# Patient Record
Sex: MALE | Race: WHITE | Employment: FULL TIME | URBAN - METROPOLITAN AREA
[De-identification: names, ages, dates, MRNs, and addresses within clinical notes are randomized per-mention and may not be internally consistent; named-entity substitution may affect disease eponyms.]

---

## 2017-01-30 ENCOUNTER — OFFICE VISIT (OUTPATIENT)
Dept: FAMILY MEDICINE CLINIC | Age: 47
End: 2017-01-30

## 2017-01-30 VITALS
DIASTOLIC BLOOD PRESSURE: 88 MMHG | HEART RATE: 78 BPM | TEMPERATURE: 97 F | HEIGHT: 69 IN | BODY MASS INDEX: 27.49 KG/M2 | RESPIRATION RATE: 12 BRPM | WEIGHT: 185.6 LBS | SYSTOLIC BLOOD PRESSURE: 120 MMHG

## 2017-01-30 DIAGNOSIS — Z99.89 OSA ON CPAP: ICD-10-CM

## 2017-01-30 DIAGNOSIS — G47.33 OSA ON CPAP: ICD-10-CM

## 2017-01-30 DIAGNOSIS — Z00.00 ANNUAL PHYSICAL EXAM: Primary | ICD-10-CM

## 2017-01-30 DIAGNOSIS — Z13.220 SCREENING, LIPID: ICD-10-CM

## 2017-01-30 DIAGNOSIS — Z23 NEED FOR INFLUENZA VACCINATION: ICD-10-CM

## 2017-01-30 DIAGNOSIS — G40.909 SEIZURE DISORDER (HCC): ICD-10-CM

## 2017-01-30 LAB
ALBUMIN SERPL-MCNC: 4.8 G/DL (ref 3.9–4.9)
ALP BLD-CCNC: 87 U/L (ref 35–104)
ALT SERPL-CCNC: 22 U/L (ref 0–41)
ANION GAP SERPL CALCULATED.3IONS-SCNC: 10 MEQ/L (ref 7–13)
ANISOCYTOSIS: NORMAL
AST SERPL-CCNC: 15 U/L (ref 0–40)
BASOPHILS ABSOLUTE: 0 K/UL (ref 0–0.2)
BASOPHILS RELATIVE PERCENT: 0.6 %
BILIRUB SERPL-MCNC: 0.4 MG/DL (ref 0–1.2)
BUN BLDV-MCNC: 12 MG/DL (ref 6–20)
BURR CELLS: NORMAL
CALCIUM SERPL-MCNC: 9.7 MG/DL (ref 8.6–10.2)
CHLORIDE BLD-SCNC: 102 MEQ/L (ref 98–107)
CHOLESTEROL, TOTAL: 201 MG/DL (ref 0–199)
CO2: 28 MEQ/L (ref 22–29)
CREAT SERPL-MCNC: 0.88 MG/DL (ref 0.7–1.2)
EOSINOPHILS ABSOLUTE: 0.2 K/UL (ref 0–0.7)
EOSINOPHILS RELATIVE PERCENT: 2.9 %
GFR AFRICAN AMERICAN: >60
GFR NON-AFRICAN AMERICAN: >60
GLOBULIN: 2.5 G/DL (ref 2.3–3.5)
GLUCOSE BLD-MCNC: 93 MG/DL (ref 74–109)
HCT VFR BLD CALC: 48 % (ref 42–52)
HDLC SERPL-MCNC: 42 MG/DL (ref 40–59)
HEMOGLOBIN: 16.1 G/DL (ref 14–18)
LDL CHOLESTEROL CALCULATED: 117 MG/DL (ref 0–129)
LYMPHOCYTES ABSOLUTE: 1.5 K/UL (ref 1–4.8)
LYMPHOCYTES RELATIVE PERCENT: 24.3 %
MCH RBC QN AUTO: 30 PG (ref 27–31.3)
MCHC RBC AUTO-ENTMCNC: 33.6 % (ref 33–37)
MCV RBC AUTO: 89.2 FL (ref 80–100)
MONOCYTES ABSOLUTE: 0.3 K/UL (ref 0.2–0.8)
MONOCYTES RELATIVE PERCENT: 5.6 %
NEUTROPHILS ABSOLUTE: 4.1 K/UL (ref 1.4–6.5)
NEUTROPHILS RELATIVE PERCENT: 66.6 %
PDW BLD-RTO: 13.5 % (ref 11.5–14.5)
PLATELET # BLD: 166 K/UL (ref 130–400)
POIKILOCYTES: NORMAL
POTASSIUM SERPL-SCNC: 4.7 MEQ/L (ref 3.5–5.1)
RBC # BLD: 5.39 M/UL (ref 4.7–6.1)
SLIDE REVIEW: NORMAL
SODIUM BLD-SCNC: 140 MEQ/L (ref 132–144)
TOTAL PROTEIN: 7.3 G/DL (ref 6.4–8.1)
TRIGL SERPL-MCNC: 212 MG/DL (ref 0–200)
WBC # BLD: 6.2 K/UL (ref 4.8–10.8)

## 2017-01-30 PROCEDURE — 99396 PREV VISIT EST AGE 40-64: CPT | Performed by: FAMILY MEDICINE

## 2017-01-30 PROCEDURE — 90658 IIV3 VACCINE SPLT 0.5 ML IM: CPT | Performed by: FAMILY MEDICINE

## 2017-01-30 PROCEDURE — 90471 IMMUNIZATION ADMIN: CPT | Performed by: FAMILY MEDICINE

## 2017-01-30 ASSESSMENT — PATIENT HEALTH QUESTIONNAIRE - PHQ9
2. FEELING DOWN, DEPRESSED OR HOPELESS: 0
SUM OF ALL RESPONSES TO PHQ9 QUESTIONS 1 & 2: 0
SUM OF ALL RESPONSES TO PHQ QUESTIONS 1-9: 0
1. LITTLE INTEREST OR PLEASURE IN DOING THINGS: 0

## 2017-08-03 ENCOUNTER — HOSPITAL ENCOUNTER (OUTPATIENT)
Dept: GENERAL RADIOLOGY | Age: 47
Discharge: HOME | End: 2017-08-03
Payer: COMMERCIAL

## 2017-08-03 DIAGNOSIS — S86.911A KNEE STRAIN, RIGHT, INITIAL ENCOUNTER: ICD-10-CM

## 2017-08-03 PROCEDURE — 73562 X-RAY EXAM OF KNEE 3: CPT

## 2017-08-16 ENCOUNTER — HOSPITAL ENCOUNTER (OUTPATIENT)
Dept: MRI IMAGING | Age: 47
Discharge: HOME | End: 2017-08-16
Payer: COMMERCIAL

## 2017-08-16 VITALS — WEIGHT: 185 LBS | BODY MASS INDEX: 27.52 KG/M2

## 2017-08-16 DIAGNOSIS — T14.8XXA SPRAIN: ICD-10-CM

## 2017-08-16 PROCEDURE — 73721 MRI JNT OF LWR EXTRE W/O DYE: CPT

## 2017-10-18 LAB
ALBUMIN SERPL-MCNC: 4.5 G/DL (ref 3.9–4.9)
ALP BLD-CCNC: 83 U/L (ref 35–104)
ALT SERPL-CCNC: 34 U/L (ref 0–41)
ANION GAP SERPL CALCULATED.3IONS-SCNC: 15 MEQ/L (ref 7–13)
AST SERPL-CCNC: 21 U/L (ref 0–40)
BILIRUB SERPL-MCNC: 0.2 MG/DL (ref 0–1.2)
BILIRUBIN DIRECT: 0 MG/DL (ref 0–0.3)
BILIRUBIN, INDIRECT: 0.2 MG/DL (ref 0–0.6)
BUN BLDV-MCNC: 17 MG/DL (ref 6–20)
CALCIUM SERPL-MCNC: 9.2 MG/DL (ref 8.6–10.2)
CHLORIDE BLD-SCNC: 103 MEQ/L (ref 98–107)
CO2: 24 MEQ/L (ref 22–29)
CREAT SERPL-MCNC: 0.88 MG/DL (ref 0.7–1.2)
GFR AFRICAN AMERICAN: >60
GFR NON-AFRICAN AMERICAN: >60
GLUCOSE BLD-MCNC: 84 MG/DL (ref 74–109)
POTASSIUM SERPL-SCNC: 4.1 MEQ/L (ref 3.5–5.1)
SODIUM BLD-SCNC: 142 MEQ/L (ref 132–144)
TOTAL PROTEIN: 6.6 G/DL (ref 6.4–8.1)

## 2018-03-05 ENCOUNTER — OFFICE VISIT (OUTPATIENT)
Dept: FAMILY MEDICINE CLINIC | Age: 48
End: 2018-03-05
Payer: COMMERCIAL

## 2018-03-05 VITALS
DIASTOLIC BLOOD PRESSURE: 90 MMHG | HEIGHT: 69 IN | OXYGEN SATURATION: 97 % | TEMPERATURE: 97.8 F | WEIGHT: 188 LBS | HEART RATE: 91 BPM | SYSTOLIC BLOOD PRESSURE: 124 MMHG | BODY MASS INDEX: 27.85 KG/M2

## 2018-03-05 DIAGNOSIS — Z11.4 ENCOUNTER FOR SCREENING FOR HIV: ICD-10-CM

## 2018-03-05 DIAGNOSIS — Z11.4 SCREENING FOR HIV (HUMAN IMMUNODEFICIENCY VIRUS): ICD-10-CM

## 2018-03-05 DIAGNOSIS — Z13.220 SCREENING, LIPID: ICD-10-CM

## 2018-03-05 DIAGNOSIS — Z00.00 ANNUAL PHYSICAL EXAM: Primary | ICD-10-CM

## 2018-03-05 DIAGNOSIS — Z00.00 ANNUAL PHYSICAL EXAM: ICD-10-CM

## 2018-03-05 LAB
ALBUMIN SERPL-MCNC: 4.9 G/DL (ref 3.9–4.9)
ALP BLD-CCNC: 90 U/L (ref 35–104)
ALT SERPL-CCNC: 32 U/L (ref 0–41)
ANION GAP SERPL CALCULATED.3IONS-SCNC: 15 MEQ/L (ref 7–13)
AST SERPL-CCNC: 18 U/L (ref 0–40)
BASOPHILS ABSOLUTE: 0 K/UL (ref 0–0.2)
BASOPHILS RELATIVE PERCENT: 0.8 %
BILIRUB SERPL-MCNC: 0.4 MG/DL (ref 0–1.2)
BUN BLDV-MCNC: 14 MG/DL (ref 6–20)
CALCIUM SERPL-MCNC: 9.8 MG/DL (ref 8.6–10.2)
CHLORIDE BLD-SCNC: 102 MEQ/L (ref 98–107)
CHOLESTEROL, TOTAL: 223 MG/DL (ref 0–199)
CO2: 28 MEQ/L (ref 22–29)
CREAT SERPL-MCNC: 0.9 MG/DL (ref 0.7–1.2)
EOSINOPHILS ABSOLUTE: 0 K/UL (ref 0–0.7)
EOSINOPHILS RELATIVE PERCENT: 0.9 %
GFR AFRICAN AMERICAN: >60
GFR NON-AFRICAN AMERICAN: >60
GLOBULIN: 2.5 G/DL (ref 2.3–3.5)
GLUCOSE BLD-MCNC: 93 MG/DL (ref 74–109)
HCT VFR BLD CALC: 47.8 % (ref 42–52)
HDLC SERPL-MCNC: 48 MG/DL (ref 40–59)
HEMOGLOBIN: 15.9 G/DL (ref 14–18)
LDL CHOLESTEROL CALCULATED: 144 MG/DL (ref 0–129)
LYMPHOCYTES ABSOLUTE: 1.2 K/UL (ref 1–4.8)
LYMPHOCYTES RELATIVE PERCENT: 24.8 %
MCH RBC QN AUTO: 30 PG (ref 27–31.3)
MCHC RBC AUTO-ENTMCNC: 33.3 % (ref 33–37)
MCV RBC AUTO: 90 FL (ref 80–100)
MONOCYTES ABSOLUTE: 0.4 K/UL (ref 0.2–0.8)
MONOCYTES RELATIVE PERCENT: 7.1 %
NEUTROPHILS ABSOLUTE: 3.3 K/UL (ref 1.4–6.5)
NEUTROPHILS RELATIVE PERCENT: 66.4 %
PDW BLD-RTO: 13.8 % (ref 11.5–14.5)
PLATELET # BLD: 160 K/UL (ref 130–400)
POTASSIUM SERPL-SCNC: 4.7 MEQ/L (ref 3.5–5.1)
RBC # BLD: 5.31 M/UL (ref 4.7–6.1)
SODIUM BLD-SCNC: 145 MEQ/L (ref 132–144)
TOTAL PROTEIN: 7.4 G/DL (ref 6.4–8.1)
TRIGL SERPL-MCNC: 156 MG/DL (ref 0–200)
WBC # BLD: 5 K/UL (ref 4.8–10.8)

## 2018-03-05 PROCEDURE — 99396 PREV VISIT EST AGE 40-64: CPT | Performed by: FAMILY MEDICINE

## 2018-03-05 NOTE — PROGRESS NOTES
SpO2: 97%     Weight: 188 lb (85.3 kg)     Height: 5' 9.13\" (1.756 m)       Physical Examination: General appearance - alert, well appearing, and in no distress and overweight  Mental status - alert, oriented to person, place, and time, normal mood, behavior, speech, dress, motor activity, and thought processes, affect appropriate to mood  Ears - bilateral TM's and external ear canals normal  Nose - normal and patent, no erythema, discharge or polyps  Mouth - mucous membranes moist, pharynx normal without lesions  Neck - supple, no significant adenopathy, carotids upstroke normal bilaterally, no bruits, thyroid exam: thyroid is normal in size without nodules or tenderness  Chest - clear to auscultation, no wheezes, rales or rhonchi, symmetric air entry  Heart - normal rate, regular rhythm, normal S1, S2, no murmurs, rubs, clicks or gallops  Abdomen - soft, nontender, nondistended, no masses or organomegaly  bowel sounds normal.  Rectal exam: Normal anal opening. Normal sphincter tone. KHAI revealed normal prostate without nodularity. Stool in the rectal vault was brown and guaiac negative. No masses appreciated. Neurological - alert, oriented, normal speech, no focal findings or movement disorder noted, cranial nerves II through XII intact, DTR's normal and symmetric, normal muscle tone, no tremors, strength 5/5  Musculoskeletal - no joint tenderness, deformity or swelling  Extremities - peripheral pulses normal, no pedal edema, no clubbing or cyanosis  Skin - normal coloration and turgor, no rashes, no suspicious skin lesions noted    1. Annual physical exam  CBC Auto Differential    Comprehensive Metabolic Panel   2. Screening, lipid  Lipid Panel   3. Encounter for screening for HIV     4. Screening for HIV (human immunodeficiency virus)  HIV-1,-2 w/Reflex to HIV-1 Western Blot       I have reviewed the following diagnostic data: NA.  Please see report for additional information.     Orders Placed This Encounter

## 2018-03-07 LAB — HIV-1 AND HIV-2 ANTIBODIES: NEGATIVE

## 2018-07-20 ENCOUNTER — OFFICE VISIT (OUTPATIENT)
Dept: FAMILY MEDICINE CLINIC | Age: 48
End: 2018-07-20
Payer: COMMERCIAL

## 2018-07-20 VITALS
WEIGHT: 198 LBS | OXYGEN SATURATION: 97 % | TEMPERATURE: 98 F | BODY MASS INDEX: 29.33 KG/M2 | HEIGHT: 69 IN | RESPIRATION RATE: 12 BRPM | DIASTOLIC BLOOD PRESSURE: 84 MMHG | HEART RATE: 86 BPM | SYSTOLIC BLOOD PRESSURE: 136 MMHG

## 2018-07-20 DIAGNOSIS — B07.8 OTHER VIRAL WARTS: Primary | ICD-10-CM

## 2018-07-20 PROCEDURE — 99214 OFFICE O/P EST MOD 30 MIN: CPT | Performed by: NURSE PRACTITIONER

## 2018-07-20 PROCEDURE — 17110 DESTRUCTION B9 LES UP TO 14: CPT | Performed by: NURSE PRACTITIONER

## 2018-07-20 ASSESSMENT — PATIENT HEALTH QUESTIONNAIRE - PHQ9
SUM OF ALL RESPONSES TO PHQ9 QUESTIONS 1 & 2: 0
1. LITTLE INTEREST OR PLEASURE IN DOING THINGS: 0
SUM OF ALL RESPONSES TO PHQ QUESTIONS 1-9: 0
2. FEELING DOWN, DEPRESSED OR HOPELESS: 0

## 2018-07-30 NOTE — PROGRESS NOTES
Subjective  Za Fournier, 50 y.o. male presents today with:  Chief Complaint   Patient presents with    Verruca Vulgaris     right hand       Patient presents today for removal of warts. The patient has multiple areas that are affected. The patient has 3 on his right ring finger, 3 on his right hand and wrist, 2 on his right arm. The patient also has 2 on his left hand. The patient states that he is getting then caught on things especially his fingers. The patient states that they are becoming uncomfortable. Objective    Vitals:    07/20/18 1620   BP: 136/84   Pulse: 86   Resp: 12   Temp: 98 °F (36.7 °C)   TempSrc: Temporal   SpO2: 97%   Weight: 198 lb (89.8 kg)   Height: 5' 9\" (1.753 m)       Physical Exam   Constitutional: He is oriented to person, place, and time. He appears well-developed and well-nourished. HENT:   Head: Normocephalic and atraumatic. Eyes: Conjunctivae and EOM are normal.   Neck: Normal range of motion. Pulmonary/Chest: Effort normal.   Musculoskeletal: Normal range of motion. Neurological: He is alert and oriented to person, place, and time. Skin: Skin is warm and dry. Psychiatric: He has a normal mood and affect. Cryotherapy Procedure Note    Pre-operative Diagnosis: verruca vulagaris    Post-operative Diagnosis: verruca vulgaris    Locations: right ring finger, right wrist and hand, right arm and left hand. Indications: discomfort    Anesthesia: not required     Procedure Details   History of allergy to iodine: no.  Pacemaker? no.    Patient informed of risks (permanent scarring, infection, light or dark discoloration, bleeding, infection, weakness, numbness and recurrence of the lesion) and benefits of the procedure and written informed consent obtained. The areas are treated with liquid nitrogen therapy, frozen until ice ball extended 2 mm beyond lesion, allowed to thaw, and treated again. The patient tolerated procedure well.   The patient was

## 2018-09-21 ENCOUNTER — OFFICE VISIT (OUTPATIENT)
Dept: FAMILY MEDICINE CLINIC | Age: 48
End: 2018-09-21
Payer: COMMERCIAL

## 2018-09-21 VITALS
HEIGHT: 69 IN | HEART RATE: 94 BPM | BODY MASS INDEX: 29.27 KG/M2 | WEIGHT: 197.6 LBS | TEMPERATURE: 98.2 F | RESPIRATION RATE: 14 BRPM | DIASTOLIC BLOOD PRESSURE: 80 MMHG | SYSTOLIC BLOOD PRESSURE: 120 MMHG | OXYGEN SATURATION: 98 %

## 2018-09-21 DIAGNOSIS — L03.114 CELLULITIS OF LEFT UPPER EXTREMITY: Primary | ICD-10-CM

## 2018-09-21 PROCEDURE — 99213 OFFICE O/P EST LOW 20 MIN: CPT | Performed by: NURSE PRACTITIONER

## 2018-09-21 RX ORDER — IBUPROFEN 800 MG/1
TABLET ORAL
COMMUNITY
Start: 2018-07-31

## 2018-09-21 RX ORDER — CLINDAMYCIN HYDROCHLORIDE 300 MG/1
300 CAPSULE ORAL 4 TIMES DAILY
Qty: 28 CAPSULE | Refills: 0 | Status: SHIPPED | OUTPATIENT
Start: 2018-09-21 | End: 2020-06-17 | Stop reason: SDUPTHER

## 2018-10-15 ASSESSMENT — ENCOUNTER SYMPTOMS: COLOR CHANGE: 1

## 2018-10-30 ENCOUNTER — TELEPHONE (OUTPATIENT)
Dept: FAMILY MEDICINE CLINIC | Age: 48
End: 2018-10-30

## 2018-10-30 DIAGNOSIS — G47.33 OSA ON CPAP: Primary | ICD-10-CM

## 2018-10-30 DIAGNOSIS — Z99.89 OSA ON CPAP: Primary | ICD-10-CM

## 2018-11-05 ENCOUNTER — HOSPITAL ENCOUNTER (OUTPATIENT)
Dept: LAB | Age: 48
Discharge: HOME | End: 2018-11-05
Payer: COMMERCIAL

## 2018-11-05 LAB
ALBUMIN SERPL-MCNC: 4.8 G/DL (ref 3.9–4.9)
ALP BLD-CCNC: 76 U/L (ref 35–104)
ALT SERPL-CCNC: 38 U/L (ref 0–41)
ANION GAP SERPL CALCULATED.3IONS-SCNC: 20 MEQ/L (ref 7–13)
AST SERPL-CCNC: 23 U/L (ref 0–40)
BILIRUB SERPL-MCNC: <0.2 MG/DL (ref 0–1.2)
BILIRUBIN DIRECT: <0.2 MG/DL (ref 0–0.3)
BILIRUBIN, INDIRECT: NORMAL MG/DL (ref 0–0.6)
BUN BLDV-MCNC: 14 MG/DL (ref 6–20)
CALCIUM SERPL-MCNC: 9.7 MG/DL (ref 8.6–10.2)
CHLORIDE BLD-SCNC: 105 MEQ/L (ref 98–107)
CO2: 22 MEQ/L (ref 22–29)
CREAT SERPL-MCNC: 0.97 MG/DL (ref 0.7–1.2)
GFR AFRICAN AMERICAN: >60
GFR NON-AFRICAN AMERICAN: >60
GLUCOSE BLD-MCNC: 92 MG/DL (ref 74–109)
POTASSIUM SERPL-SCNC: 4.5 MEQ/L (ref 3.5–5.1)
SODIUM BLD-SCNC: 147 MEQ/L (ref 132–144)
TOTAL PROTEIN: 7.3 G/DL (ref 6.4–8.1)

## 2018-11-05 PROCEDURE — 80076 HEPATIC FUNCTION PANEL: CPT

## 2018-11-05 PROCEDURE — 36415 COLL VENOUS BLD VENIPUNCTURE: CPT

## 2018-11-05 PROCEDURE — 80048 BASIC METABOLIC PNL TOTAL CA: CPT

## 2019-03-04 ENCOUNTER — TELEPHONE (OUTPATIENT)
Dept: ADMINISTRATIVE | Age: 49
End: 2019-03-04

## 2019-03-04 ENCOUNTER — TELEPHONE (OUTPATIENT)
Dept: FAMILY MEDICINE CLINIC | Age: 49
End: 2019-03-04

## 2019-11-01 LAB
ALBUMIN: 4.2 G/DL (ref 3.4–5)
ALP BLD-CCNC: 72 U/L (ref 33–120)
ALT SERPL-CCNC: 30 U/L (ref 10–52)
ANION GAP SERPL CALCULATED.3IONS-SCNC: 11 MMOL/L (ref 10–20)
AST SERPL-CCNC: 18 U/L (ref 9–39)
BASOPHILS # BLD: 0.03 X10E9/L (ref 0–0.1)
BASOPHILS RELATIVE PERCENT: 0.7 % (ref 0–2)
BICARBONATE: 29 MMOL/L (ref 21–32)
BILIRUB SERPL-MCNC: 0.4 MG/DL (ref 0–1.2)
BILIRUBIN DIRECT: 0.1 MG/DL (ref 0–0.3)
CALCIUM SERPL-MCNC: 9.3 MG/DL (ref 8.6–10.3)
CHLORIDE BLD-SCNC: 106 MMOL/L (ref 98–107)
CREAT SERPL-MCNC: 1.08 MG/DL (ref 0.5–1.3)
EOSINOPHIL # BLD: 0.11 X10E9/L (ref 0–0.7)
EOSINOPHILS RELATIVE PERCENT: 2.6 % (ref 0–6)
ERYTHROCYTE [DISTWIDTH] IN BLOOD BY AUTOMATED COUNT: 12.3 % (ref 11.5–14)
GFR AFRICAN AMERICAN: >60 ML/MIN/1.73M2
GFR NON-AFRICAN AMERICAN: >60 ML/MIN/1.73M2
GLUCOSE: 100 MG/DL (ref 74–99)
HCT VFR BLD CALC: 44.6 % (ref 41–52)
HEMOGLOBIN: 14.7 G/DL (ref 13.5–17)
IMMATURE GRANULOCYTES %: 0.2 % (ref 0–0.9)
LYMPHOCYTES # BLD: 28.1 % (ref 13–44)
LYMPHOCYTES RELATIVE PERCENT: 1.17 X10E9/L (ref 1.2–4.8)
MCHC RBC AUTO-ENTMCNC: 33 G/DL (ref 32–36)
MCV RBC AUTO: 90 FL (ref 80–100)
MONOCYTES # BLD: 0.31 X10E9/L (ref 0.1–1)
MONOCYTES RELATIVE PERCENT: 7.4 % (ref 2–10)
NEUTROPHILS RELATIVE PERCENT: 61 % (ref 40–80)
NEUTROPHILS: 2.54 X10E9/L (ref 1.2–7.7)
PLATELET # BLD: 152 X10E9/L (ref 150–450)
POTASSIUM SERPL-SCNC: 4.6 MMOL/L (ref 3.5–5.3)
RBC # BLD: 4.97 X10E12/L (ref 4.5–5.9)
SODIUM BLD-SCNC: 141 MMOL/L (ref 136–145)
TOTAL PROTEIN: 6.8 G/DL (ref 6.4–8.2)
UREA NITROGEN: 16 MG/DL (ref 6–23)
WBC: 4.2 X10E9/L (ref 4.4–11.3)

## 2019-11-05 ENCOUNTER — OFFICE VISIT (OUTPATIENT)
Dept: NEUROLOGY | Age: 49
End: 2019-11-05
Payer: COMMERCIAL

## 2019-11-05 VITALS
DIASTOLIC BLOOD PRESSURE: 96 MMHG | BODY MASS INDEX: 29.03 KG/M2 | SYSTOLIC BLOOD PRESSURE: 138 MMHG | HEART RATE: 94 BPM | WEIGHT: 196 LBS | HEIGHT: 69 IN

## 2019-11-05 DIAGNOSIS — G40.909 SEIZURE DISORDER (HCC): Primary | ICD-10-CM

## 2019-11-05 PROCEDURE — 99214 OFFICE O/P EST MOD 30 MIN: CPT | Performed by: PSYCHIATRY & NEUROLOGY

## 2019-11-05 RX ORDER — OXCARBAZEPINE 300 MG/1
300 TABLET, FILM COATED ORAL 3 TIMES DAILY
Qty: 270 TABLET | Refills: 3 | Status: SHIPPED | OUTPATIENT
Start: 2019-11-05 | End: 2020-09-08

## 2019-11-05 ASSESSMENT — ENCOUNTER SYMPTOMS
NAUSEA: 0
PHOTOPHOBIA: 0
SHORTNESS OF BREATH: 0
VOMITING: 0
CHOKING: 0
BACK PAIN: 0
TROUBLE SWALLOWING: 0

## 2020-06-17 ENCOUNTER — OFFICE VISIT (OUTPATIENT)
Dept: FAMILY MEDICINE CLINIC | Age: 50
End: 2020-06-17
Payer: COMMERCIAL

## 2020-06-17 VITALS
TEMPERATURE: 97 F | RESPIRATION RATE: 12 BRPM | SYSTOLIC BLOOD PRESSURE: 110 MMHG | DIASTOLIC BLOOD PRESSURE: 70 MMHG | WEIGHT: 199 LBS | HEIGHT: 69 IN | HEART RATE: 87 BPM | BODY MASS INDEX: 29.47 KG/M2 | OXYGEN SATURATION: 98 %

## 2020-06-17 PROCEDURE — 99213 OFFICE O/P EST LOW 20 MIN: CPT | Performed by: NURSE PRACTITIONER

## 2020-06-17 RX ORDER — CLINDAMYCIN HYDROCHLORIDE 300 MG/1
300 CAPSULE ORAL 4 TIMES DAILY
Qty: 28 CAPSULE | Refills: 0 | Status: SHIPPED | OUTPATIENT
Start: 2020-06-17 | End: 2020-06-24

## 2020-06-17 ASSESSMENT — ENCOUNTER SYMPTOMS
NAUSEA: 0
WHEEZING: 0
COUGH: 1
SHORTNESS OF BREATH: 1
COLOR CHANGE: 1
DIARRHEA: 0
VOMITING: 0

## 2020-06-17 ASSESSMENT — PATIENT HEALTH QUESTIONNAIRE - PHQ9
SUM OF ALL RESPONSES TO PHQ QUESTIONS 1-9: 0
2. FEELING DOWN, DEPRESSED OR HOPELESS: 0
SUM OF ALL RESPONSES TO PHQ9 QUESTIONS 1 & 2: 0
1. LITTLE INTEREST OR PLEASURE IN DOING THINGS: 0
SUM OF ALL RESPONSES TO PHQ QUESTIONS 1-9: 0

## 2020-06-17 NOTE — PROGRESS NOTES
Subjective  Karson Truong, 44 y.o. male presents today with:  Chief Complaint   Patient presents with    Elbow Problem     C/o infection in right elbow 3x days. Patient tried triple antibotic today for sx. Patient report the elbow swelled up in size        Rash   This is a new problem. Episode onset: 3 days ago. The problem has been gradually worsening since onset. The affected locations include the left elbow. The rash is characterized by redness and swelling (skin is \"tight\"). It is unknown if there was an exposure to a precipitant. Associated symptoms include coughing and shortness of breath. Pertinent negatives include no diarrhea, fatigue, fever or vomiting. Treatments tried: triple antibiotic. The treatment provided no relief. Review of Systems   Constitutional: Negative for chills, fatigue and fever. Respiratory: Positive for cough and shortness of breath. Negative for wheezing. Cardiovascular: Negative for chest pain and palpitations. Gastrointestinal: Negative for diarrhea, nausea and vomiting. Skin: Positive for color change and rash. Neurological: Negative for dizziness, light-headedness and headaches. PMH, Surgical Hx, Family Hx, and Social Hx reviewed and updated. Health Maintenance reviewed. Objective  Vitals:    06/17/20 1832   BP: 110/70   Site: Left Upper Arm   Position: Sitting   Cuff Size: Small Adult   Pulse: 87   Resp: 12   Temp: 97 °F (36.1 °C)   TempSrc: Temporal   SpO2: 98%   Weight: 199 lb (90.3 kg)   Height: 5' 9\" (1.753 m)     BP Readings from Last 3 Encounters:   06/17/20 110/70   11/05/19 (!) 138/96   09/21/18 120/80     Wt Readings from Last 3 Encounters:   06/17/20 199 lb (90.3 kg)   11/05/19 196 lb (88.9 kg)   09/21/18 197 lb 9.6 oz (89.6 kg)     Physical Exam  Constitutional:       General: He is not in acute distress. Appearance: Normal appearance. Cardiovascular:      Rate and Rhythm: Normal rate and regular rhythm.       Heart sounds: Normal

## 2020-09-08 RX ORDER — OXCARBAZEPINE 300 MG/1
TABLET, FILM COATED ORAL
Qty: 270 TABLET | Refills: 3 | Status: SHIPPED | OUTPATIENT
Start: 2020-09-08 | End: 2021-09-16

## 2020-11-20 PROBLEM — R33.9 RETENTION OF URINE: Status: ACTIVE | Noted: 2020-11-20

## 2020-11-20 PROBLEM — N39.0 UTI (URINARY TRACT INFECTION): Status: ACTIVE | Noted: 2020-11-20

## 2020-11-23 ENCOUNTER — OFFICE VISIT (OUTPATIENT)
Dept: NEUROLOGY | Age: 50
End: 2020-11-23
Payer: COMMERCIAL

## 2020-11-23 VITALS
DIASTOLIC BLOOD PRESSURE: 94 MMHG | WEIGHT: 198.8 LBS | BODY MASS INDEX: 29.36 KG/M2 | HEART RATE: 69 BPM | SYSTOLIC BLOOD PRESSURE: 142 MMHG

## 2020-11-23 DIAGNOSIS — G40.909 SEIZURE DISORDER (HCC): ICD-10-CM

## 2020-11-23 LAB
ALBUMIN SERPL-MCNC: 4.4 G/DL (ref 3.5–4.6)
ALP BLD-CCNC: 93 U/L (ref 35–104)
ALT SERPL-CCNC: 25 U/L (ref 0–41)
ANION GAP SERPL CALCULATED.3IONS-SCNC: 11 MEQ/L (ref 9–15)
AST SERPL-CCNC: 16 U/L (ref 0–40)
BASOPHILS ABSOLUTE: 0 K/UL (ref 0–0.2)
BASOPHILS RELATIVE PERCENT: 0.5 %
BILIRUB SERPL-MCNC: 0.3 MG/DL (ref 0.2–0.7)
BILIRUBIN DIRECT: <0.2 MG/DL (ref 0–0.4)
BILIRUBIN, INDIRECT: NORMAL MG/DL (ref 0–0.6)
BUN BLDV-MCNC: 11 MG/DL (ref 6–20)
CALCIUM SERPL-MCNC: 9.7 MG/DL (ref 8.5–9.9)
CHLORIDE BLD-SCNC: 103 MEQ/L (ref 95–107)
CO2: 27 MEQ/L (ref 20–31)
CREAT SERPL-MCNC: 0.91 MG/DL (ref 0.7–1.2)
EOSINOPHILS ABSOLUTE: 0.1 K/UL (ref 0–0.7)
EOSINOPHILS RELATIVE PERCENT: 2.9 %
GFR AFRICAN AMERICAN: >60
GFR NON-AFRICAN AMERICAN: >60
GLUCOSE BLD-MCNC: 101 MG/DL (ref 70–99)
HCT VFR BLD CALC: 44.6 % (ref 42–52)
HEMOGLOBIN: 14.9 G/DL (ref 14–18)
LYMPHOCYTES ABSOLUTE: 1.7 K/UL (ref 1–4.8)
LYMPHOCYTES RELATIVE PERCENT: 37.5 %
MCH RBC QN AUTO: 29.9 PG (ref 27–31.3)
MCHC RBC AUTO-ENTMCNC: 33.5 % (ref 33–37)
MCV RBC AUTO: 89.3 FL (ref 80–100)
MONOCYTES ABSOLUTE: 0.4 K/UL (ref 0.2–0.8)
MONOCYTES RELATIVE PERCENT: 8 %
NEUTROPHILS ABSOLUTE: 2.3 K/UL (ref 1.4–6.5)
NEUTROPHILS RELATIVE PERCENT: 51.1 %
PDW BLD-RTO: 13.7 % (ref 11.5–14.5)
PLATELET # BLD: 152 K/UL (ref 130–400)
POTASSIUM SERPL-SCNC: 4.3 MEQ/L (ref 3.4–4.9)
RBC # BLD: 4.99 M/UL (ref 4.7–6.1)
SODIUM BLD-SCNC: 141 MEQ/L (ref 135–144)
TOTAL PROTEIN: 6.7 G/DL (ref 6.3–8)
WBC # BLD: 4.4 K/UL (ref 4.8–10.8)

## 2020-11-23 PROCEDURE — 99213 OFFICE O/P EST LOW 20 MIN: CPT | Performed by: PSYCHIATRY & NEUROLOGY

## 2020-11-23 ASSESSMENT — ENCOUNTER SYMPTOMS
BACK PAIN: 0
COLOR CHANGE: 0
NAUSEA: 0
CHOKING: 0
VOMITING: 0
PHOTOPHOBIA: 0
SHORTNESS OF BREATH: 0
TROUBLE SWALLOWING: 0

## 2020-11-23 NOTE — PROGRESS NOTES
Subjective:      Patient ID: Lalito Hardin is a 44 y.o. male who presents today for:  Chief Complaint   Patient presents with    Follow-up     Pt is doing good and no recent seizures. HPI 47-year-old right-handed gentleman with a known history of seizure disorder. Patient continues on Trileptal 300 mg twice a day. Patient has not been seen for a year patient is doing quite well on the medication he has not any side effects of medication. His last liver tests reveals normal sodium levels this was done a year ago. Patient has not any changes during the year.       Past Medical History:   Diagnosis Date    Seizure disorder [facility])      Past Surgical History:   Procedure Laterality Date    URETHRAL STRICTURE DILATATION       Social History     Socioeconomic History    Marital status:      Spouse name: Not on file    Number of children: Not on file    Years of education: Not on file    Highest education level: Not on file   Occupational History    Not on file   Social Needs    Financial resource strain: Not on file    Food insecurity     Worry: Not on file     Inability: Not on file    Transportation needs     Medical: Not on file     Non-medical: Not on file   Tobacco Use    Smoking status: Never Smoker    Smokeless tobacco: Current User     Types: Snuff   Substance and Sexual Activity    Alcohol use: Yes     Comment: occasionally     Drug use: No    Sexual activity: Not on file   Lifestyle    Physical activity     Days per week: Not on file     Minutes per session: Not on file    Stress: Not on file   Relationships    Social connections     Talks on phone: Not on file     Gets together: Not on file     Attends Mormon service: Not on file     Active member of club or organization: Not on file     Attends meetings of clubs or organizations: Not on file     Relationship status: Not on file    Intimate partner violence     Fear of current or ex partner: Not on file     Emotionally abused: Not on file     Physically abused: Not on file     Forced sexual activity: Not on file   Other Topics Concern    Not on file   Social History Narrative    Not on file     Family History   Problem Relation Age of Onset    High Blood Pressure Mother     High Blood Pressure Father      Allergies   Allergen Reactions    Pcn [Penicillins] Anaphylaxis    Bactrim [Sulfamethoxazole-Trimethoprim]        Current Outpatient Medications   Medication Sig Dispense Refill    OXcarbazepine (TRILEPTAL) 300 MG tablet TAKE 1 TABLET BY MOUTH THREE TIMES A  tablet 3    Respiratory Therapy Supplies MISC 1 each by Does not apply route nightly 1 each 0    CPAP Machine MISC by Does not apply route Auto-pap, 5-18 mm H20. 1 each 0    Respiratory Therapy Supplies (PILLOW MASK/ADULT) MISC 1 Device by Does not apply route nightly 1 each 0    ibuprofen (ADVIL;MOTRIN) 800 MG tablet       aspirin 81 MG EC tablet Take 81 mg by mouth daily. No current facility-administered medications for this visit. Review of Systems   Constitutional: Negative for fever. HENT: Negative for ear pain, tinnitus and trouble swallowing. Eyes: Negative for photophobia and visual disturbance. Respiratory: Negative for choking and shortness of breath. Cardiovascular: Negative for chest pain and palpitations. Gastrointestinal: Negative for nausea and vomiting. Musculoskeletal: Negative for back pain, gait problem, joint swelling, myalgias, neck pain and neck stiffness. Skin: Negative for color change. Allergic/Immunologic: Negative for food allergies. Neurological: Negative for dizziness, tremors, seizures, syncope, facial asymmetry, speech difficulty, weakness, light-headedness, numbness and headaches. Psychiatric/Behavioral: Negative for behavioral problems, confusion, hallucinations and sleep disturbance.        Objective:   BP (!) 142/94 (Site: Left Upper Arm, Position: Sitting, Cuff Size: Medium Adult)   Pulse 69 [address] 144 03/05/2018     No results found for: Gregary Angry, LABBENZ, CANNAB, COCAINESCRN, LABMETH, OPIATESCREENURINE, PHENCYCLIDINESCREENURINE, PPXUR, ETOH  No results found for: LITHIUM, DILFRTOT, VALPROATE    Assessment:       Diagnosis Orders   1. Seizure disorder [facility])     44year-old right-hand gentleman history of generalized seizure and sleep apnea. The patient actually doing quite well. It is more than likely that these are frontal lobe seizures from probable sleep apnea syndrome. This is well been described recently. Patient does not have narcolepsy and has not any insomnia  Patient last seizure was 9 years ago patient continues on oxcarbazepine 300 mg.  3 times a day. Has been compliant and doing quite well  We will arrange for his laboratory tests. Plan:      No orders of the defined types were placed in this encounter. No orders of the defined types were placed in this encounter. No follow-ups on file.       Tanya Gunter MD

## 2020-12-20 PROBLEM — N39.0 UTI (URINARY TRACT INFECTION): Status: RESOLVED | Noted: 2020-11-20 | Resolved: 2020-12-20

## 2021-09-16 RX ORDER — OXCARBAZEPINE 300 MG/1
TABLET, FILM COATED ORAL
Qty: 270 TABLET | Refills: 3 | Status: SHIPPED | OUTPATIENT
Start: 2021-09-16

## 2021-11-19 PROBLEM — J34.2 DEVIATED NASAL SEPTUM: Status: ACTIVE | Noted: 2021-11-19

## 2021-11-19 PROBLEM — N45.3 ORCHITIS AND EPIDIDYMITIS: Status: ACTIVE | Noted: 2021-11-19

## 2021-11-19 PROBLEM — R03.0 PREHYPERTENSION: Status: ACTIVE | Noted: 2021-11-19

## 2021-11-22 ENCOUNTER — OFFICE VISIT (OUTPATIENT)
Dept: NEUROLOGY | Age: 51
End: 2021-11-22
Payer: COMMERCIAL

## 2021-11-22 VITALS
HEART RATE: 85 BPM | WEIGHT: 202 LBS | DIASTOLIC BLOOD PRESSURE: 84 MMHG | SYSTOLIC BLOOD PRESSURE: 136 MMHG | BODY MASS INDEX: 29.83 KG/M2

## 2021-11-22 DIAGNOSIS — G47.33 OBSTRUCTIVE SLEEP APNEA SYNDROME: ICD-10-CM

## 2021-11-22 DIAGNOSIS — G40.909 SEIZURE DISORDER (HCC): ICD-10-CM

## 2021-11-22 DIAGNOSIS — G40.909 SEIZURE DISORDER (HCC): Primary | ICD-10-CM

## 2021-11-22 LAB
ALBUMIN SERPL-MCNC: 4.5 G/DL (ref 3.5–4.6)
ALP BLD-CCNC: 86 U/L (ref 35–104)
ALT SERPL-CCNC: 31 U/L (ref 0–41)
ANION GAP SERPL CALCULATED.3IONS-SCNC: 8 MEQ/L (ref 9–15)
AST SERPL-CCNC: 17 U/L (ref 0–40)
BILIRUB SERPL-MCNC: 0.3 MG/DL (ref 0.2–0.7)
BILIRUBIN DIRECT: <0.2 MG/DL (ref 0–0.4)
BILIRUBIN, INDIRECT: NORMAL MG/DL (ref 0–0.6)
BUN BLDV-MCNC: 10 MG/DL (ref 6–20)
CALCIUM SERPL-MCNC: 9.4 MG/DL (ref 8.5–9.9)
CHLORIDE BLD-SCNC: 102 MEQ/L (ref 95–107)
CO2: 28 MEQ/L (ref 20–31)
CREAT SERPL-MCNC: 0.89 MG/DL (ref 0.7–1.2)
GFR AFRICAN AMERICAN: >60
GFR NON-AFRICAN AMERICAN: >60
GLUCOSE BLD-MCNC: 80 MG/DL (ref 70–99)
POTASSIUM SERPL-SCNC: 4.6 MEQ/L (ref 3.4–4.9)
SODIUM BLD-SCNC: 138 MEQ/L (ref 135–144)
TOTAL PROTEIN: 7.2 G/DL (ref 6.3–8)

## 2021-11-22 PROCEDURE — 99213 OFFICE O/P EST LOW 20 MIN: CPT | Performed by: PSYCHIATRY & NEUROLOGY

## 2021-11-22 ASSESSMENT — ENCOUNTER SYMPTOMS
NAUSEA: 0
TROUBLE SWALLOWING: 0
BACK PAIN: 0
VOMITING: 0
SHORTNESS OF BREATH: 0
CHOKING: 0
COLOR CHANGE: 0
PHOTOPHOBIA: 0

## 2021-11-22 NOTE — PROGRESS NOTES
Subjective:      Patient ID: Jess Montanez is a 46 y.o. male who presents today for:  Chief Complaint   Patient presents with    Follow-up     Pt states that things have been going good. He says no seizures that he is aware of . He state sno concerns at this time. HPI 52-year-old right-handed gentleman now with a history of generalized seizure disorder. Patient is on Trileptal 300 mg three times a day. Patient has not been seen here for a year but does very well on the medication. She reports no hospitalizations or any other symptoms    Past Medical History:   Diagnosis Date    Seizure disorder [facility])      Past Surgical History:   Procedure Laterality Date    URETHRAL STRICTURE DILATATION       Social History     Socioeconomic History    Marital status:      Spouse name: Not on file    Number of children: Not on file    Years of education: Not on file    Highest education level: Not on file   Occupational History    Not on file   Tobacco Use    Smoking status: Never Smoker    Smokeless tobacco: Current User     Types: Snuff   Substance and Sexual Activity    Alcohol use: Yes     Comment: occasionally     Drug use: No    Sexual activity: Not on file   Other Topics Concern    Not on file   Social History Narrative    Not on file     Social Determinants of Health     Financial Resource Strain:     Difficulty of Paying Living Expenses: Not on file   Food Insecurity:     Worried About Running Out of Food in the Last Year: Not on file    Socrates of Food in the Last Year: Not on file   Transportation Needs:     Lack of Transportation (Medical): Not on file    Lack of Transportation (Non-Medical):  Not on file   Physical Activity:     Days of Exercise per Week: Not on file    Minutes of Exercise per Session: Not on file   Stress:     Feeling of Stress : Not on file   Social Connections:     Frequency of Communication with Friends and Family: Not on file    Frequency of Social Gatherings with Friends and Family: Not on file    Attends Anglican Services: Not on file    Active Member of Clubs or Organizations: Not on file    Attends Club or Organization Meetings: Not on file    Marital Status: Not on file   Intimate Partner Violence:     Fear of Current or Ex-Partner: Not on file    Emotionally Abused: Not on file    Physically Abused: Not on file    Sexually Abused: Not on file   Housing Stability:     Unable to Pay for Housing in the Last Year: Not on file    Number of [place] in the Last Year: Not on file    Unstable Housing in the Last Year: Not on file     Family History   Problem Relation Age of Onset    High Blood Pressure Mother     High Blood Pressure Father      Allergies   Allergen Reactions    Penicillins Anaphylaxis and Swelling    Sulfamethoxazole-Trimethoprim Other (See Comments)       Current Outpatient Medications   Medication Sig Dispense Refill    OXcarbazepine (TRILEPTAL) 300 MG tablet TAKE 1 TABLET BY MOUTH THREE TIMES A  tablet 3    Respiratory Therapy Supplies MISC 1 each by Does not apply route nightly 1 each 0    ibuprofen (ADVIL;MOTRIN) 800 MG tablet       CPAP Machine MISC by Does not apply route Auto-pap, 5-18 mm H20. 1 each 0    Respiratory Therapy Supplies (PILLOW MASK/ADULT) MISC 1 Device by Does not apply route nightly 1 each 0    aspirin 81 MG EC tablet Take 81 mg by mouth daily. No current facility-administered medications for this visit. Review of Systems   Constitutional: Negative for fever. HENT: Negative for ear pain, tinnitus and trouble swallowing. Eyes: Negative for photophobia and visual disturbance. Respiratory: Negative for choking and shortness of breath. Cardiovascular: Negative for chest pain and palpitations. Gastrointestinal: Negative for nausea and vomiting. Musculoskeletal: Negative for back pain, gait problem, joint swelling, myalgias, neck pain and neck stiffness.    Skin: Negative for color change. Allergic/Immunologic: Negative for food allergies. Neurological: Negative for dizziness, tremors, seizures, syncope, facial asymmetry, speech difficulty, weakness, light-headedness, numbness and headaches. Psychiatric/Behavioral: Negative for behavioral problems, confusion, hallucinations and sleep disturbance. Objective:   /84 (Site: Left Upper Arm, Position: Sitting, Cuff Size: Medium Adult)   Pulse 85   Wt 202 lb (91.6 kg)   BMI 29.83 kg/m²     Physical Exam  Vitals reviewed. Eyes:      Pupils: Pupils are equal, round, and reactive to light. Cardiovascular:      Rate and Rhythm: Normal rate and regular rhythm. Heart sounds: No murmur heard. Pulmonary:      Effort: Pulmonary effort is normal.      Breath sounds: Normal breath sounds. Abdominal:      General: Bowel sounds are normal.   Musculoskeletal:         General: Normal range of motion. Cervical back: Normal range of motion. Skin:     General: Skin is warm. Neurological:      Mental Status: He is alert and oriented to person, place, and time. Cranial Nerves: No cranial nerve deficit. Sensory: No sensory deficit. Motor: No abnormal muscle tone. Coordination: Coordination normal.      Deep Tendon Reflexes: Reflexes are normal and symmetric. Babinski sign absent on the right side. Babinski sign absent on the left side. Psychiatric:         Mood and Affect: Mood normal.         No results found.     Lab Results   Component Value Date    WBC 4.4 11/23/2020    RBC 4.99 11/23/2020    RBC 4.87 10/18/2011    HGB 14.9 11/23/2020    HCT 44.6 11/23/2020    MCV 89.3 11/23/2020    MCH 29.9 11/23/2020    MCHC 33.5 11/23/2020    RDW 13.7 11/23/2020     11/23/2020    MPV 8.8 02/22/2015     Lab Results   Component Value Date     11/23/2020    K 4.3 11/23/2020     11/23/2020    CO2 27 11/23/2020    BUN 11 11/23/2020    CREATININE 0.91 11/23/2020    GFRAA >60.0 11/23/2020 LABGLOM >60.0 11/23/2020    GLUCOSE 101 11/23/2020    GLUCOSE 100 11/01/2019    PROT 6.7 11/23/2020    LABALBU 4.4 11/23/2020    LABALBU 4.2 11/01/2019    CALCIUM 9.7 11/23/2020    BILITOT 0.3 11/23/2020    ALKPHOS 93 11/23/2020    AST 16 11/23/2020    ALT 25 11/23/2020     No results found for: PROTIME, INR  Lab Results   Component Value Date    TSH 2.299 06/04/2013    TSH Not Done 06/04/2013     Lab Results   Component Value Date    TRIG 156 03/05/2018    HDL 48 03/05/2018    LDLCALC 144 03/05/2018     No results found for: Weyman May, LABBENZ, CANNAB, COCAINESCRN, LABMETH, OPIATESCREENURINE, PHENCYCLIDINESCREENURINE, PPXUR, ETOH  No results found for: LITHIUM, DILFRTOT, VALPROATE    Assessment:       Diagnosis Orders   1. Seizure disorder [facility])  Basic Metabolic Panel    Hepatic Function Panel    Basic Metabolic Panel   2. Obstructive sleep apnea syndrome     Seizure disorder well controlled on oxcarbazepine 300 mg 3 times a day and denies seizures for many years. Some of the seizures may be  related to underlying sleep apnea and he uses machine on a regular basis. We keep an eye on this. He will require blood tests to make sure he does not have hyponatremia. Patient be followed in a year      Plan:      Orders Placed This Encounter   Procedures    Basic Metabolic Panel     Standing Status:   Future     Standing Expiration Date:   11/22/2022    Hepatic Function Panel     Standing Status:   Future     Standing Expiration Date:   11/22/2022    Basic Metabolic Panel     Standing Status:   Future     Standing Expiration Date:   11/22/2022     No orders of the defined types were placed in this encounter. No follow-ups on file.       Woody Monaco MD

## 2022-11-07 RX ORDER — OXCARBAZEPINE 300 MG/1
TABLET, FILM COATED ORAL
Qty: 270 TABLET | Refills: 3 | Status: SHIPPED | OUTPATIENT
Start: 2022-11-07

## 2022-11-17 PROBLEM — H16.142 PUNCTATE KERATITIS OF LEFT EYE: Status: ACTIVE | Noted: 2021-01-18

## 2022-11-21 ENCOUNTER — OFFICE VISIT (OUTPATIENT)
Dept: NEUROLOGY | Age: 52
End: 2022-11-21
Payer: COMMERCIAL

## 2022-11-21 VITALS
WEIGHT: 200.4 LBS | SYSTOLIC BLOOD PRESSURE: 130 MMHG | DIASTOLIC BLOOD PRESSURE: 82 MMHG | HEART RATE: 75 BPM | BODY MASS INDEX: 29.59 KG/M2

## 2022-11-21 DIAGNOSIS — G47.33 OBSTRUCTIVE SLEEP APNEA SYNDROME: ICD-10-CM

## 2022-11-21 DIAGNOSIS — G40.909 SEIZURE DISORDER (HCC): Primary | ICD-10-CM

## 2022-11-21 DIAGNOSIS — G40.909 SEIZURE DISORDER (HCC): ICD-10-CM

## 2022-11-21 LAB
ALBUMIN SERPL-MCNC: 4.6 G/DL (ref 3.5–4.6)
ALP BLD-CCNC: 97 U/L (ref 35–104)
ALT SERPL-CCNC: 27 U/L (ref 0–41)
ANION GAP SERPL CALCULATED.3IONS-SCNC: 17 MEQ/L (ref 9–15)
AST SERPL-CCNC: 17 U/L (ref 0–40)
BILIRUB SERPL-MCNC: <0.2 MG/DL (ref 0.2–0.7)
BUN BLDV-MCNC: 13 MG/DL (ref 6–20)
CALCIUM SERPL-MCNC: 9.7 MG/DL (ref 8.5–9.9)
CHLORIDE BLD-SCNC: 109 MEQ/L (ref 95–107)
CO2: 24 MEQ/L (ref 20–31)
CREAT SERPL-MCNC: 1.19 MG/DL (ref 0.7–1.2)
GFR SERPL CREATININE-BSD FRML MDRD: >60 ML/MIN/{1.73_M2}
GLOBULIN: 2.5 G/DL (ref 2.3–3.5)
GLUCOSE BLD-MCNC: 76 MG/DL (ref 70–99)
POTASSIUM SERPL-SCNC: 4.1 MEQ/L (ref 3.4–4.9)
SODIUM BLD-SCNC: 150 MEQ/L (ref 135–144)
TOTAL PROTEIN: 7.1 G/DL (ref 6.3–8)

## 2022-11-21 PROCEDURE — 99213 OFFICE O/P EST LOW 20 MIN: CPT | Performed by: PSYCHIATRY & NEUROLOGY

## 2022-11-21 RX ORDER — NAPROXEN 500 MG/1
TABLET ORAL
COMMUNITY
Start: 2022-07-24

## 2022-11-21 ASSESSMENT — ENCOUNTER SYMPTOMS
COLOR CHANGE: 0
TROUBLE SWALLOWING: 0
PHOTOPHOBIA: 0
BACK PAIN: 0
CHOKING: 0
SHORTNESS OF BREATH: 0
VOMITING: 0
NAUSEA: 0

## 2022-11-21 NOTE — PROGRESS NOTES
Subjective:      Patient ID: Clara Harley is a 45 y.o. male who presents today for:  Chief Complaint   Patient presents with    Follow-up     Pt states he is well. Pt stated his wife told him he has not had a seizure and is sleeping better. HPI 45 right-handed gentleman now with a history of generalized seizure disorder. Patient is on Trileptal 300 mg 3 times a day. Patient has been compliant and has not any hyponatremia and his last sodiums were done in November last year and has not any further labs. Patient does not report of any dizzy spells.   He has sleep apnea and is sleeping well    Past Medical History:   Diagnosis Date    Seizure disorder [facility])      Past Surgical History:   Procedure Laterality Date    URETHRAL STRICTURE DILATATION       Social History     Socioeconomic History    Marital status:      Spouse name: Not on file    Number of children: Not on file    Years of education: Not on file    Highest education level: Not on file   Occupational History    Not on file   Tobacco Use    Smoking status: Never    Smokeless tobacco: Current     Types: Snuff   Substance and Sexual Activity    Alcohol use: Yes     Comment: occasionally     Drug use: No    Sexual activity: Not on file   Other Topics Concern    Not on file   Social History Narrative    Not on file     Social Determinants of Health     Financial Resource Strain: Not on file   Food Insecurity: Not on file   Transportation Needs: Not on file   Physical Activity: Not on file   Stress: Not on file   Social Connections: Not on file   Intimate Partner Violence: Not on file   Housing Stability: Not on file     Family History   Problem Relation Age of Onset    High Blood Pressure Mother     High Blood Pressure Father      Allergies   Allergen Reactions    Penicillins Anaphylaxis and Swelling    Sulfamethoxazole-Trimethoprim Other (See Comments)       Current Outpatient Medications   Medication Sig Dispense Refill    naproxen (NAPROSYN) 500 MG tablet Take by mouth      OXcarbazepine (TRILEPTAL) 300 MG tablet TAKE 1 TABLET BY MOUTH THREE TIMES A  tablet 3    Respiratory Therapy Supplies MISC 1 each by Does not apply route nightly 1 each 0    ibuprofen (ADVIL;MOTRIN) 800 MG tablet       CPAP Machine MISC by Does not apply route Auto-pap, 5-18 mm H20. 1 each 0    Respiratory Therapy Supplies (PILLOW MASK/ADULT) MISC 1 Device by Does not apply route nightly 1 each 0    aspirin 81 MG EC tablet Take 81 mg by mouth daily. (Patient not taking: Reported on 11/21/2022)       No current facility-administered medications for this visit. Review of Systems   Constitutional:  Negative for fever. HENT:  Negative for ear pain, tinnitus and trouble swallowing. Eyes:  Negative for photophobia and visual disturbance. Respiratory:  Negative for choking and shortness of breath. Cardiovascular:  Negative for chest pain and palpitations. Gastrointestinal:  Negative for nausea and vomiting. Musculoskeletal:  Negative for back pain, gait problem, joint swelling, myalgias, neck pain and neck stiffness. Skin:  Negative for color change. Allergic/Immunologic: Negative for food allergies. Neurological:  Negative for dizziness, tremors, seizures, syncope, facial asymmetry, speech difficulty, weakness, light-headedness, numbness and headaches. Psychiatric/Behavioral:  Negative for behavioral problems, confusion, hallucinations and sleep disturbance. Objective:   /82 (Site: Right Upper Arm, Position: Sitting, Cuff Size: Medium Adult)   Pulse 75   Wt 200 lb 6.4 oz (90.9 kg)   BMI 29.59 kg/m²     Physical Exam  Vitals reviewed. Eyes:      Pupils: Pupils are equal, round, and reactive to light. Cardiovascular:      Rate and Rhythm: Normal rate and regular rhythm. Heart sounds: No murmur heard. Pulmonary:      Effort: Pulmonary effort is normal.      Breath sounds: Normal breath sounds.    Abdominal:      General: Bowel sounds are normal.   Musculoskeletal:         General: Normal range of motion. Cervical back: Normal range of motion. Skin:     General: Skin is warm. Neurological:      Mental Status: He is alert and oriented to person, place, and time. Cranial Nerves: No cranial nerve deficit. Sensory: No sensory deficit. Motor: No abnormal muscle tone. Coordination: Coordination normal.      Deep Tendon Reflexes: Reflexes are normal and symmetric. Babinski sign absent on the right side. Babinski sign absent on the left side. Psychiatric:         Mood and Affect: Mood normal.       No results found.     Lab Results   Component Value Date/Time    WBC 4.4 11/23/2020 08:59 AM    RBC 4.99 11/23/2020 08:59 AM    RBC 4.87 10/18/2011 01:08 PM    HGB 14.9 11/23/2020 08:59 AM    HCT 44.6 11/23/2020 08:59 AM    MCV 89.3 11/23/2020 08:59 AM    MCH 29.9 11/23/2020 08:59 AM    MCHC 33.5 11/23/2020 08:59 AM    RDW 13.7 11/23/2020 08:59 AM     11/23/2020 08:59 AM    MPV 8.8 02/22/2015 10:29 AM     Lab Results   Component Value Date/Time     11/22/2021 01:28 PM    K 4.6 11/22/2021 01:28 PM     11/22/2021 01:28 PM    CO2 28 11/22/2021 01:28 PM    BUN 10 11/22/2021 01:28 PM    CREATININE 0.89 11/22/2021 01:28 PM    GFRAA >60.0 11/22/2021 01:28 PM    LABGLOM >60.0 11/22/2021 01:28 PM    GLUCOSE 80 11/22/2021 01:28 PM    GLUCOSE 100 11/01/2019 10:19 AM    PROT 7.2 11/22/2021 01:28 PM    LABALBU 4.5 11/22/2021 01:28 PM    LABALBU 4.2 11/01/2019 10:19 AM    CALCIUM 9.4 11/22/2021 01:28 PM    BILITOT 0.3 11/22/2021 01:28 PM    ALKPHOS 86 11/22/2021 01:28 PM    AST 17 11/22/2021 01:28 PM    ALT 31 11/22/2021 01:28 PM     No results found for: PROTIME, INR  Lab Results   Component Value Date/Time    TSH 2.299 06/04/2013 08:44 PM    TSH Not Done 06/04/2013 08:44 PM     Lab Results   Component Value Date/Time    TRIG 156 03/05/2018 11:12 AM    HDL 48 03/05/2018 11:12 AM    LDLCALC 144 03/05/2018 11:12 AM     No results found for: Gerhardt Rings, LABBENZ, CANNAB, COCAINESCRN, LABMETH, OPIATESCREENURINE, PHENCYCLIDINESCREENURINE, PPXUR, ETOH  No results found for: LITHIUM, DILFRTOT, VALPROATE    Assessment:       Diagnosis Orders   1. Seizure disorder ([facility] [address]        2. Obstructive sleep apnea syndrome        Seizure disorder without recurrent seizures. Patient has been on Trileptal 300 mg 3 times a day with very good control. He has not any hyponatremia has not any recent labs done. He has sleep apnea and uses a CPAP machine and sleeps well. Is not any dizzy spells or any other side effects of concern. We will keep him on the same medication for now and he will let me know if there are any concerns. Lam Shaikh MD, Nora Santana, American Board of Psychiatry & Neurology  Board Certified in Vascular Neurology  Board Certified in Neuromuscular Medicine  Certified in [facility]:      No orders of the defined types were placed in this encounter. No orders of the defined types were placed in this encounter. No follow-ups on file.       Nova Dugan MD

## 2023-03-20 ENCOUNTER — CLINICAL SUPPORT (OUTPATIENT)
Dept: PRIMARY CARE | Facility: CLINIC | Age: 53
End: 2023-03-20
Payer: COMMERCIAL

## 2023-03-20 DIAGNOSIS — Z23 NEED FOR SHINGLES VACCINE: ICD-10-CM

## 2023-03-20 PROCEDURE — 90471 IMMUNIZATION ADMIN: CPT | Performed by: FAMILY MEDICINE

## 2023-03-20 PROCEDURE — 90750 HZV VACC RECOMBINANT IM: CPT | Performed by: FAMILY MEDICINE

## 2023-08-30 ENCOUNTER — TELEPHONE (OUTPATIENT)
Dept: PRIMARY CARE | Facility: CLINIC | Age: 53
End: 2023-08-30
Payer: COMMERCIAL

## 2023-08-30 ENCOUNTER — PATIENT OUTREACH (OUTPATIENT)
Dept: PRIMARY CARE | Facility: CLINIC | Age: 53
End: 2023-08-30
Payer: COMMERCIAL

## 2023-08-30 NOTE — PROGRESS NOTES
Discharge Facility: [facility]  Discharge Diagnosis: encephalopathy, AMS  Admission Date: 8/27/23  Discharge Date: 8/29/23    PCP Appointment Date: TBD- task to office  Specialist Appointment Date:   - Dr. Drarell Thomas (neuro): 11/20/23  Hospital Encounter and Summary: Linked     TCM complete. 2 attempts were made to reach patient to assess needs.   No return call as of this note.

## 2023-08-30 NOTE — TELEPHONE ENCOUNTER
The patient was recently admitted and treated for Encephalopathy AMS at the [facility].   Per his wife, he has an appointment with the [facility] to see a neurologist on 9/8/2023.  She would like to schedule him a hospital follow up with Dr. Lainez.    May someone reach out to the patient as to when and where he may be placed on Dr. Lainez's schedule?     The patient may be contacted at [phone].

## 2023-09-07 PROBLEM — R79.82 ELEVATED C-REACTIVE PROTEIN (CRP): Status: ACTIVE | Noted: 2023-08-28

## 2023-09-07 PROBLEM — D12.6 TUBULAR ADENOMA OF COLON: Status: ACTIVE | Noted: 2023-09-07

## 2023-09-07 PROBLEM — N45.3 ORCHITIS AND EPIDIDYMITIS: Status: ACTIVE | Noted: 2021-11-19

## 2023-09-07 PROBLEM — G40.309 IDIOPATHIC GENERALIZED EPILEPSY (MULTI): Status: ACTIVE | Noted: 2023-08-28

## 2023-09-07 PROBLEM — J34.2 DEVIATED NASAL SEPTUM: Status: ACTIVE | Noted: 2021-11-19

## 2023-09-07 PROBLEM — R33.9 RETENTION OF URINE: Status: ACTIVE | Noted: 2020-11-20

## 2023-09-07 PROBLEM — R50.9 FEVER: Status: ACTIVE | Noted: 2023-08-28

## 2023-09-07 PROBLEM — R41.82 ALTERED MENTAL STATUS: Status: ACTIVE | Noted: 2023-08-28

## 2023-09-07 PROBLEM — Z01.812 ENCOUNTER FOR SCREENING LABORATORY TESTING FOR COVID-19 VIRUS IN ASYMPTOMATIC PATIENT: Status: ACTIVE | Noted: 2023-09-07

## 2023-09-07 PROBLEM — R03.0 PREHYPERTENSION: Status: ACTIVE | Noted: 2021-11-19

## 2023-09-07 PROBLEM — Z11.52 ENCOUNTER FOR SCREENING LABORATORY TESTING FOR COVID-19 VIRUS IN ASYMPTOMATIC PATIENT: Status: ACTIVE | Noted: 2023-09-07

## 2023-09-07 PROBLEM — Z86.73 HISTORY OF RIGHT MCA STROKE: Status: ACTIVE | Noted: 2023-08-28

## 2023-09-07 PROBLEM — R09.1 PLEURITIS: Status: ACTIVE | Noted: 2023-09-07

## 2023-09-07 PROBLEM — H16.142 PUNCTATE KERATITIS OF LEFT EYE: Status: ACTIVE | Noted: 2021-01-18

## 2023-09-07 PROBLEM — U07.1 COVID-19 VIRUS INFECTION: Status: ACTIVE | Noted: 2023-09-07

## 2023-09-07 PROBLEM — G04.90 ENCEPHALITIS (HHS-HCC): Status: ACTIVE | Noted: 2023-08-28

## 2023-09-07 PROBLEM — G40.909 SEIZURE DISORDER (MULTI): Status: ACTIVE | Noted: 2023-09-07

## 2023-09-07 PROBLEM — Z20.822 EXPOSURE TO COVID-19 VIRUS: Status: ACTIVE | Noted: 2023-09-07

## 2023-09-07 RX ORDER — HYDROCODONE BITARTRATE AND ACETAMINOPHEN 5; 325 MG/1; MG/1
TABLET ORAL
COMMUNITY
Start: 2023-01-26 | End: 2023-12-11 | Stop reason: ALTCHOICE

## 2023-09-07 RX ORDER — ATORVASTATIN CALCIUM 40 MG/1
1 TABLET, FILM COATED ORAL NIGHTLY
COMMUNITY
Start: 2023-08-29 | End: 2023-09-25

## 2023-09-07 RX ORDER — NAPROXEN 500 MG/1
TABLET ORAL
COMMUNITY
Start: 2022-07-24 | End: 2023-09-13 | Stop reason: SINTOL

## 2023-09-07 RX ORDER — TESTOSTERONE 20.25 MG/1.25G
GEL TOPICAL
COMMUNITY

## 2023-09-07 RX ORDER — DOXYCYCLINE HYCLATE 100 MG
1 TABLET ORAL 2 TIMES DAILY
COMMUNITY
Start: 2023-01-26 | End: 2023-12-11 | Stop reason: ALTCHOICE

## 2023-09-07 RX ORDER — OXCARBAZEPINE 300 MG/1
300 TABLET, FILM COATED ORAL 2 TIMES DAILY
COMMUNITY
Start: 2015-08-25

## 2023-09-13 ENCOUNTER — OFFICE VISIT (OUTPATIENT)
Dept: PRIMARY CARE | Facility: CLINIC | Age: 53
End: 2023-09-13
Payer: COMMERCIAL

## 2023-09-13 VITALS
OXYGEN SATURATION: 100 % | DIASTOLIC BLOOD PRESSURE: 89 MMHG | RESPIRATION RATE: 16 BRPM | HEART RATE: 92 BPM | TEMPERATURE: 97.7 F | WEIGHT: 206 LBS | SYSTOLIC BLOOD PRESSURE: 130 MMHG | BODY MASS INDEX: 30.51 KG/M2 | HEIGHT: 69 IN

## 2023-09-13 DIAGNOSIS — Z09 HOSPITAL DISCHARGE FOLLOW-UP: Primary | ICD-10-CM

## 2023-09-13 DIAGNOSIS — E66.09 CLASS 1 OBESITY DUE TO EXCESS CALORIES WITHOUT SERIOUS COMORBIDITY WITH BODY MASS INDEX (BMI) OF 30.0 TO 30.9 IN ADULT: ICD-10-CM

## 2023-09-13 DIAGNOSIS — Z23 NEED FOR IMMUNIZATION AGAINST INFLUENZA: ICD-10-CM

## 2023-09-13 DIAGNOSIS — Z13.220 LIPID SCREENING: ICD-10-CM

## 2023-09-13 DIAGNOSIS — Z12.5 SCREENING PSA (PROSTATE SPECIFIC ANTIGEN): ICD-10-CM

## 2023-09-13 DIAGNOSIS — G40.909 SEIZURE DISORDER (MULTI): ICD-10-CM

## 2023-09-13 DIAGNOSIS — R03.0 PREHYPERTENSION: ICD-10-CM

## 2023-09-13 DIAGNOSIS — I63.411 CEREBRAL INFARCTION DUE TO EMBOLISM OF RIGHT MIDDLE CEREBRAL ARTERY (MULTI): ICD-10-CM

## 2023-09-13 DIAGNOSIS — G04.90 ENCEPHALITIS (HHS-HCC): ICD-10-CM

## 2023-09-13 PROBLEM — U07.1 COVID-19 VIRUS INFECTION: Status: RESOLVED | Noted: 2023-09-07 | Resolved: 2023-09-13

## 2023-09-13 PROBLEM — Z20.822 EXPOSURE TO COVID-19 VIRUS: Status: RESOLVED | Noted: 2023-09-07 | Resolved: 2023-09-13

## 2023-09-13 PROBLEM — E66.811 CLASS 1 OBESITY DUE TO EXCESS CALORIES WITHOUT SERIOUS COMORBIDITY WITH BODY MASS INDEX (BMI) OF 30.0 TO 30.9 IN ADULT: Status: ACTIVE | Noted: 2023-09-13

## 2023-09-13 PROCEDURE — 90471 IMMUNIZATION ADMIN: CPT | Performed by: FAMILY MEDICINE

## 2023-09-13 PROCEDURE — 90682 RIV4 VACC RECOMBINANT DNA IM: CPT | Performed by: FAMILY MEDICINE

## 2023-09-13 PROCEDURE — 99495 TRANSJ CARE MGMT MOD F2F 14D: CPT | Performed by: FAMILY MEDICINE

## 2023-09-13 ASSESSMENT — PATIENT HEALTH QUESTIONNAIRE - PHQ9
1. LITTLE INTEREST OR PLEASURE IN DOING THINGS: NOT AT ALL
SUM OF ALL RESPONSES TO PHQ9 QUESTIONS 1 & 2: 0
2. FEELING DOWN, DEPRESSED OR HOPELESS: NOT AT ALL

## 2023-09-13 NOTE — PATIENT INSTRUCTIONS
Patient to continue current medications (with any exceptions as noted) and diet. Follow-up in 2-3 month(s) otherwise as needed.      Patient is to return for fasting CBC, CMP, lipid panel, PSA labs at their convenience prior to his next appointment. Fasting is nothing to eat or drink except water or black coffee for 8-12 hours. Will call patient with results when available.     Ordered echocardiogram. Will call patient with results when available.     Patient is to follow up with Dr. Thomas as scheduled.     Flublok vaccine given in the office today.

## 2023-09-13 NOTE — PROGRESS NOTES
"Subjective   Patient ID: Larry Murphy is a 52 y.o. male who presents for Hospital Follow-up.  I last saw the patient on 3/20/23.     HPI   Larry Murphy is a 52 y.o. male presenting today for follow-up after being discharged from the hospital 15 days ago. The main problem requiring admission was encephalopathy. The discharge summary and/or Transitional Care Management documentation was reviewed. Medication reconciliation was performed as indicated via the \"Dk as Reviewed\" timestamp.     Larry Murphy was contacted by Transitional Care Management services two days after his discharge. This encounter and supporting documentation was reviewed.    Patient saw Dr. Friend on 8/29/23. The hospital wanted him to have an echocardiogram done as well.     Review of Systems  Except positives as noted in the CC & HPI      Constitutional: Denies fevers, chills, night sweats, fatigue, weight changes, change in appetite    Eyes: Denies blurry vision, double vision    ENT: Denies otalgia, trouble hearing, tinnitus, vertigo, nasal congestion, rhinorrhea, sore throat    Neck: Denies swelling, masses    Cardiovascular: Denies chest pain, palpitations, edema, orthopnea, syncope    Respiratory: Denies dyspnea, cough, wheezing, postural nocturnal dyspnea    Gastrointestinal: Denies abdominal pain, nausea, vomiting, diarrhea, constipation, melena, hematochezia    Genitourinary: Denies dysuria, hematuria, frequency, urgency    Musculoskeletal: Denies back pain, neck pain, arthralgias, myalgias    Integumentary: Denies skin lesions, rashes, masses    Neurological: Denies dizziness, headaches, confusion, limb weakness, paresthesias, syncope, convulsions    Psychiatric: Denies depression, anxiety, homicidal ideations, suicidal ideations, sleep disturbances    Endocrine: Denies polyphagia, polydipsia, polyuria, weakness, hair thinning, heat intolerance, cold intolerance, weight changes    Heme/Lymph: Denies easy bruising, easy bleeding, swollen " "glands    Objective   /89   Pulse 92   Temp 36.5 °C (97.7 °F)   Resp 16   Ht 1.753 m (5' 9\")   Wt 93.4 kg (206 lb)   SpO2 100%   BMI 30.42 kg/m²     Physical Exam  General Appearance - well-developed, well-nourished, 52 y.o., White male in no acute distress.     Skin - warm, pink and dry without rash or concerning lesions.     Mental Status - alert and oriented x 3. Normal mood and affect appropriate to mood.     Neck - supple without lymphadenopathy. Carotid pulses are normal without bruits. Thyroid is normal in midline without nodules.     Chest - lungs are clear to auscultation without rales, rhonchi or wheezes.     Heart - regular, rate and rhythm without murmurs, rubs or gallops.    Abdomen - soft, obese, protuberant, nontender, nondistended. No masses, hepatomegaly or splenomegaly is noted. No rebound, rigidity or guarding is noted. Bowel sounds are normoactive.    Extremities - no cyanosis, clubbing or edema. Pedal pulses are 2+ normal at the dorsalis pedis and posterior pulses bilaterally.     Neurological - cranial nerves II through XII are grossly intact. Motor strength 5/5 at all fours.     Assessment/Plan   1. Hospital discharge follow-up        2. Encephalitis        3. Seizure disorder (CMS/HCC)        4. Cerebral infarction due to embolism of right middle cerebral artery (CMS/HCC)  Transthoracic Echo (TTE) Complete      5. Prehypertension  CBC and Auto Differential    Comprehensive Metabolic Panel    Follow Up In [facility] - PCP - Established      6. Class 1 obesity due to excess calories without serious comorbidity with body mass index (BMI) of 30.0 to 30.9 in adult        7. Lipid screening  Lipid Panel      8. Screening PSA (prostate specific antigen)  Prostate Specific Antigen, Screen      9. Need for immunization against influenza  Flu vaccine, quadrivalent, recombinant, preservative free, adult (FLUBLOK)      Patient to continue current medications (with any exceptions " as noted) and diet. Follow-up in 2-3 month(s) otherwise as needed.      Patient is to return for fasting CBC, CMP, lipid panel, PSA labs at their convenience prior to his next appointment. Fasting is nothing to eat or drink except water or black coffee for 8-12 hours. Will call patient with results when available.     Ordered echocardiogram. Will call patient with results when available.     Patient is to follow up with Dr. Thomas and Dr. Friend as scheduled.     Flublok vaccine given in the office today.         Scribe Attestation  By signing my name below, IJeana Scribe   attest that this documentation has been prepared under the direction and in the presence of Kvng Lainez MD.

## 2023-09-24 DIAGNOSIS — Z86.73 HISTORY OF RIGHT MCA STROKE: Primary | ICD-10-CM

## 2023-09-25 RX ORDER — ATORVASTATIN CALCIUM 40 MG/1
40 TABLET, FILM COATED ORAL NIGHTLY
Qty: 90 TABLET | Refills: 1 | Status: SHIPPED | OUTPATIENT
Start: 2023-09-25

## 2023-09-25 NOTE — TELEPHONE ENCOUNTER
Recent Visits  Date Type Provider Dept   09/13/23 Office Visit Kvng Lainez MD Do [id number] [facility]   03/20/23 Clinical Support JORDAN  [id number] PC1 MA 1 Do [id number] [facility]   Showing recent visits within past 540 days and meeting all other requirements  Future Appointments  Date Type Provider Dept   12/11/23 Appointment Kvng Lainez MD Do [id number] [facility]   Showing future appointments within next 180 days and meeting all other requirements

## 2023-09-27 ENCOUNTER — APPOINTMENT (OUTPATIENT)
Dept: PRIMARY CARE | Facility: CLINIC | Age: 53
End: 2023-09-27
Payer: COMMERCIAL

## 2023-10-05 ENCOUNTER — PATIENT OUTREACH (OUTPATIENT)
Dept: PRIMARY CARE | Facility: CLINIC | Age: 53
End: 2023-10-05
Payer: COMMERCIAL

## 2023-10-05 NOTE — PROGRESS NOTES
Unable to reach patient for call back after patient's follow up appointment with PCP.   IVETHM with call back number for patient to call if needed   If no voicemail available call attempts x 2 were made to contact the patient to assist with any questions or concerns patient may have.

## 2023-10-16 ENCOUNTER — HOSPITAL ENCOUNTER (OUTPATIENT)
Dept: CARDIOLOGY | Facility: HOSPITAL | Age: 53
Discharge: HOME | End: 2023-10-16
Payer: COMMERCIAL

## 2023-10-16 DIAGNOSIS — G45.8 OTHER TRANSIENT CEREBRAL ISCHEMIC ATTACKS AND RELATED SYNDROMES: ICD-10-CM

## 2023-10-16 DIAGNOSIS — I63.9 CEREBROVASCULAR ACCIDENT (CVA), UNSPECIFIED MECHANISM (MULTI): ICD-10-CM

## 2023-10-16 LAB — EJECTION FRACTION APICAL 4 CHAMBER: 55

## 2023-10-16 PROCEDURE — 2500000004 HC RX 250 GENERAL PHARMACY W/ HCPCS (ALT 636 FOR OP/ED): Performed by: NURSE PRACTITIONER

## 2023-10-16 PROCEDURE — 93306 TTE W/DOPPLER COMPLETE: CPT

## 2023-10-16 PROCEDURE — 93306 TTE W/DOPPLER COMPLETE: CPT | Performed by: INTERNAL MEDICINE

## 2023-10-16 RX ADMIN — PERFLUTREN 2 ML: 6.52 INJECTION, SUSPENSION INTRAVENOUS at 12:02

## 2023-10-27 RX ORDER — OXCARBAZEPINE 300 MG/1
TABLET, FILM COATED ORAL
Qty: 270 TABLET | Refills: 3 | Status: SHIPPED | OUTPATIENT
Start: 2023-10-27

## 2023-10-27 NOTE — TELEPHONE ENCOUNTER
Pharmacy is requesting medication refill.  Please approve or deny this request.    Rx requested:  Requested Prescriptions     Pending Prescriptions Disp Refills    OXcarbazepine (TRILEPTAL) 300 MG tablet [Pharmacy Med Name: OXCARBAZEPINE 300 MG TABLET] 270 tablet 3     Sig: TAKE 1 TABLET BY MOUTH THREE TIMES A DAY         Last Office Visit:   11/21/2022      Next Visit Date:  Future Appointments   Date Time Provider [address] [place] [address]   11/20/2023  1:00 PM Mckenzie Costello MD [facility] -

## 2023-11-13 PROBLEM — R53.83 FATIGUE: Status: ACTIVE | Noted: 2023-11-13

## 2023-11-13 PROBLEM — R41.82 ALTERED MENTAL STATUS: Status: ACTIVE | Noted: 2023-08-28

## 2023-11-13 PROBLEM — Z20.822 EXPOSURE TO SEVERE ACUTE RESPIRATORY SYNDROME CORONAVIRUS 2 (SARS-COV-2): Status: ACTIVE | Noted: 2023-03-20

## 2023-11-13 PROBLEM — R50.9 FEVER: Status: ACTIVE | Noted: 2023-08-28

## 2023-11-13 PROBLEM — G40.309 IDIOPATHIC GENERALIZED EPILEPSY (HCC): Status: ACTIVE | Noted: 2023-08-28

## 2023-11-13 PROBLEM — D12.6 TUBULAR ADENOMA OF COLON: Status: ACTIVE | Noted: 2023-11-13

## 2023-11-13 PROBLEM — J35.1 HYPERTROPHY OF TONSILS: Status: ACTIVE | Noted: 2023-01-26

## 2023-11-13 PROBLEM — U07.1 DISEASE DUE TO SEVERE ACUTE RESPIRATORY SYNDROME CORONAVIRUS 2 (SARS-COV-2): Status: ACTIVE | Noted: 2022-07-24

## 2023-11-13 PROBLEM — R79.82 ELEVATED C-REACTIVE PROTEIN (CRP): Status: ACTIVE | Noted: 2023-08-28

## 2023-12-08 NOTE — PROGRESS NOTES
"Subjective   Patient ID: Larry Murphy is a 53 y.o. male who presents for Hypertension. I last saw the patient on 9/13/2023.     HPI   Patient has no medical complaints today or refill requests. He states that he has been eating well and sleeping better.     Review of Systems  Except positives as noted in the CC & HPI      Constitutional: Denies fevers, chills, night sweats, fatigue, weight changes, change in appetite    Eyes: Denies blurry vision, double vision    ENT: Denies otalgia, trouble hearing, tinnitus, vertigo, nasal congestion, rhinorrhea, sore throat    Neck: Denies swelling, masses    Cardiovascular: Denies chest pain, palpitations, edema, orthopnea, syncope    Respiratory: Denies dyspnea, cough, wheezing, postural nocturnal dyspnea    Gastrointestinal: Denies abdominal pain, nausea, vomiting, diarrhea, constipation, melena, hematochezia    Genitourinary: Denies dysuria, hematuria, frequency, urgency    Musculoskeletal: Denies back pain, neck pain, arthralgias, myalgias    Integumentary: Denies skin lesions, rashes, masses    Neurological: Denies dizziness, headaches, confusion, limb weakness, paresthesias, syncope, convulsions    Psychiatric: Denies depression, anxiety, homicidal ideations, suicidal ideations, sleep disturbances    Endocrine: Denies polyphagia, polydipsia, polyuria, weakness, hair thinning, heat intolerance, cold intolerance, weight changes    Heme/Lymph: Denies easy bruising, easy bleeding, swollen glands    Objective   /76 (BP Location: Right arm, Patient Position: Sitting)   Pulse 69   Temp 36.3 °C (97.3 °F)   Resp 16   Ht 1.753 m (5' 9\")   Wt 91.3 kg (201 lb 3.2 oz)   SpO2 97%   BMI 29.71 kg/m²     Physical Exam  104/76 on recheck of BP in the right arm.     General Appearance - well-developed, well-nourished, 53 y.o., White male in no acute distress.     Skin - warm, pink and dry without rash or concerning lesions.     Mental Status - alert and oriented x 3. Normal " mood and affect appropriate to mood.     Neck - supple without lymphadenopathy. Carotid pulses are normal without bruits. Thyroid is normal in midline without nodules.     Chest - lungs are clear to auscultation without rales, rhonchi or wheezes.     Heart - regular, rate and rhythm without murmurs, rubs or gallops.    Extremities - no cyanosis, clubbing or edema. Pedal pulses are 2+ normal at the dorsalis pedis and posterior pulses bilaterally.     Neurological - cranial nerves II through XII are grossly intact. Motor strength 5/5 at all fours.     Results  Reviewed echocardiogram results from 10/16/23.      Assessment/Plan   1. Prehypertension  Follow Up In [facility] - PCP - Established    Follow Up In [facility] - PCP - Established      2. Hypogonadism male        3. Mixed hyperlipidemia  Follow Up In [facility] - PCP - Established      4. Nonintractable generalized idiopathic epilepsy without status epilepticus (CMS/HCC)        5. Obstructive sleep apnea syndrome        6. Overweight with body mass index (BMI) of 29 to 29.9 in adult        Patient to continue current medications (with any exceptions as noted) and diet. Follow-up in 6 month(s) otherwise as needed.      Patient is to complete fasting CBC, CMP, lipid panel, PSA, testosterone labs today. Will call patient with results when available.     Patient is to follow up with Dr. Friend (neuro) as scheduled.       Scribe Attestation  By signing my name below, Jeana BETANCOURT, Catalina   attest that this documentation has been prepared under the direction and in the presence of Kvng Lainez MD.

## 2023-12-11 ENCOUNTER — OFFICE VISIT (OUTPATIENT)
Dept: PRIMARY CARE | Facility: CLINIC | Age: 53
End: 2023-12-11
Payer: COMMERCIAL

## 2023-12-11 ENCOUNTER — LAB (OUTPATIENT)
Dept: LAB | Facility: LAB | Age: 53
End: 2023-12-11
Payer: COMMERCIAL

## 2023-12-11 VITALS
BODY MASS INDEX: 29.8 KG/M2 | HEIGHT: 69 IN | RESPIRATION RATE: 16 BRPM | SYSTOLIC BLOOD PRESSURE: 104 MMHG | TEMPERATURE: 97.3 F | HEART RATE: 69 BPM | OXYGEN SATURATION: 97 % | WEIGHT: 201.2 LBS | DIASTOLIC BLOOD PRESSURE: 76 MMHG

## 2023-12-11 DIAGNOSIS — E29.1 HYPOGONADISM MALE: ICD-10-CM

## 2023-12-11 DIAGNOSIS — R03.0 PREHYPERTENSION: Primary | ICD-10-CM

## 2023-12-11 DIAGNOSIS — G40.309 NONINTRACTABLE GENERALIZED IDIOPATHIC EPILEPSY WITHOUT STATUS EPILEPTICUS (MULTI): ICD-10-CM

## 2023-12-11 DIAGNOSIS — G47.33 OBSTRUCTIVE SLEEP APNEA SYNDROME: ICD-10-CM

## 2023-12-11 DIAGNOSIS — Z12.5 SCREENING PSA (PROSTATE SPECIFIC ANTIGEN): ICD-10-CM

## 2023-12-11 DIAGNOSIS — Z13.220 LIPID SCREENING: ICD-10-CM

## 2023-12-11 DIAGNOSIS — E78.2 MIXED HYPERLIPIDEMIA: ICD-10-CM

## 2023-12-11 DIAGNOSIS — E66.3 OVERWEIGHT WITH BODY MASS INDEX (BMI) OF 29 TO 29.9 IN ADULT: ICD-10-CM

## 2023-12-11 DIAGNOSIS — R03.0 PREHYPERTENSION: ICD-10-CM

## 2023-12-11 LAB
ALBUMIN SERPL BCP-MCNC: 4.4 G/DL (ref 3.4–5)
ALP SERPL-CCNC: 92 U/L (ref 33–120)
ALT SERPL W P-5'-P-CCNC: 35 U/L (ref 10–52)
ANION GAP SERPL CALC-SCNC: 12 MMOL/L (ref 10–20)
AST SERPL W P-5'-P-CCNC: 22 U/L (ref 9–39)
BASOPHILS # BLD AUTO: 0.03 X10*3/UL (ref 0–0.1)
BASOPHILS NFR BLD AUTO: 0.5 %
BILIRUB SERPL-MCNC: 0.4 MG/DL (ref 0–1.2)
BUN SERPL-MCNC: 12 MG/DL (ref 6–23)
CALCIUM SERPL-MCNC: 9.7 MG/DL (ref 8.6–10.3)
CHLORIDE SERPL-SCNC: 106 MMOL/L (ref 98–107)
CHOLEST SERPL-MCNC: 143 MG/DL (ref 0–199)
CHOLESTEROL/HDL RATIO: 3.6
CO2 SERPL-SCNC: 30 MMOL/L (ref 21–32)
CREAT SERPL-MCNC: 1.02 MG/DL (ref 0.5–1.3)
EOSINOPHIL # BLD AUTO: 0.1 X10*3/UL (ref 0–0.7)
EOSINOPHIL NFR BLD AUTO: 1.6 %
ERYTHROCYTE [DISTWIDTH] IN BLOOD BY AUTOMATED COUNT: 13.4 % (ref 11.5–14.5)
GFR SERPL CREATININE-BSD FRML MDRD: 87 ML/MIN/1.73M*2
GLUCOSE SERPL-MCNC: 99 MG/DL (ref 74–99)
HCT VFR BLD AUTO: 44.4 % (ref 41–52)
HDLC SERPL-MCNC: 39.2 MG/DL
HGB BLD-MCNC: 14.4 G/DL (ref 13.5–17.5)
IMM GRANULOCYTES # BLD AUTO: 0.02 X10*3/UL (ref 0–0.7)
IMM GRANULOCYTES NFR BLD AUTO: 0.3 % (ref 0–0.9)
LDLC SERPL CALC-MCNC: 84 MG/DL
LYMPHOCYTES # BLD AUTO: 1.25 X10*3/UL (ref 1.2–4.8)
LYMPHOCYTES NFR BLD AUTO: 19.9 %
MCH RBC QN AUTO: 28.4 PG (ref 26–34)
MCHC RBC AUTO-ENTMCNC: 32.4 G/DL (ref 32–36)
MCV RBC AUTO: 88 FL (ref 80–100)
MONOCYTES # BLD AUTO: 0.31 X10*3/UL (ref 0.1–1)
MONOCYTES NFR BLD AUTO: 4.9 %
NEUTROPHILS # BLD AUTO: 4.57 X10*3/UL (ref 1.2–7.7)
NEUTROPHILS NFR BLD AUTO: 72.8 %
NON HDL CHOLESTEROL: 104 MG/DL (ref 0–149)
NRBC BLD-RTO: 0 /100 WBCS (ref 0–0)
PLATELET # BLD AUTO: 242 X10*3/UL (ref 150–450)
POTASSIUM SERPL-SCNC: 4.2 MMOL/L (ref 3.5–5.3)
PROT SERPL-MCNC: 7.3 G/DL (ref 6.4–8.2)
PSA SERPL-MCNC: 2.8 NG/ML
RBC # BLD AUTO: 5.07 X10*6/UL (ref 4.5–5.9)
SODIUM SERPL-SCNC: 144 MMOL/L (ref 136–145)
TRIGL SERPL-MCNC: 97 MG/DL (ref 0–149)
VLDL: 19 MG/DL (ref 0–40)
WBC # BLD AUTO: 6.3 X10*3/UL (ref 4.4–11.3)

## 2023-12-11 PROCEDURE — 85025 COMPLETE CBC W/AUTO DIFF WBC: CPT

## 2023-12-11 PROCEDURE — 84153 ASSAY OF PSA TOTAL: CPT

## 2023-12-11 PROCEDURE — 80061 LIPID PANEL: CPT

## 2023-12-11 PROCEDURE — 99213 OFFICE O/P EST LOW 20 MIN: CPT | Performed by: FAMILY MEDICINE

## 2023-12-11 PROCEDURE — 36415 COLL VENOUS BLD VENIPUNCTURE: CPT

## 2023-12-11 PROCEDURE — 84402 ASSAY OF FREE TESTOSTERONE: CPT

## 2023-12-11 PROCEDURE — 3008F BODY MASS INDEX DOCD: CPT | Performed by: FAMILY MEDICINE

## 2023-12-11 PROCEDURE — 80053 COMPREHEN METABOLIC PANEL: CPT

## 2023-12-11 ASSESSMENT — PATIENT HEALTH QUESTIONNAIRE - PHQ9
SUM OF ALL RESPONSES TO PHQ9 QUESTIONS 1 & 2: 0
2. FEELING DOWN, DEPRESSED OR HOPELESS: NOT AT ALL
1. LITTLE INTEREST OR PLEASURE IN DOING THINGS: NOT AT ALL

## 2023-12-11 NOTE — PATIENT INSTRUCTIONS
Patient to continue current medications (with any exceptions as noted) and diet. Follow-up in 6 month(s) otherwise as needed.      Patient is to complete fasting CBC, CMP, lipid panel, PSA labs today. Will call patient with results when available.     Patient is to follow up with Dr. Friend (neuro) as scheduled.

## 2023-12-16 LAB
TESTOSTERONE FREE (CHAN): 42.3 PG/ML (ref 35–155)
TESTOSTERONE,TOTAL,LC-MS/MS: 284 NG/DL (ref 250–1100)

## 2023-12-21 DIAGNOSIS — Z12.5 SCREENING PSA (PROSTATE SPECIFIC ANTIGEN): ICD-10-CM

## 2024-02-29 ENCOUNTER — HOSPITAL ENCOUNTER (EMERGENCY)
Facility: HOSPITAL | Age: 54
Discharge: HOME | End: 2024-02-29
Payer: COMMERCIAL

## 2024-02-29 ENCOUNTER — OFFICE VISIT (OUTPATIENT)
Dept: ORTHOPEDIC SURGERY | Facility: CLINIC | Age: 54
End: 2024-02-29
Payer: COMMERCIAL

## 2024-02-29 ENCOUNTER — APPOINTMENT (OUTPATIENT)
Dept: RADIOLOGY | Facility: HOSPITAL | Age: 54
End: 2024-02-29
Payer: COMMERCIAL

## 2024-02-29 VITALS
TEMPERATURE: 97.5 F | SYSTOLIC BLOOD PRESSURE: 151 MMHG | DIASTOLIC BLOOD PRESSURE: 96 MMHG | HEIGHT: 69 IN | RESPIRATION RATE: 18 BRPM | BODY MASS INDEX: 28.88 KG/M2 | HEART RATE: 68 BPM | OXYGEN SATURATION: 97 % | WEIGHT: 195 LBS

## 2024-02-29 DIAGNOSIS — M70.21 OLECRANON BURSITIS OF RIGHT ELBOW: Primary | ICD-10-CM

## 2024-02-29 PROCEDURE — 73080 X-RAY EXAM OF ELBOW: CPT | Mod: RT

## 2024-02-29 PROCEDURE — 99213 OFFICE O/P EST LOW 20 MIN: CPT | Performed by: FAMILY MEDICINE

## 2024-02-29 PROCEDURE — 99283 EMERGENCY DEPT VISIT LOW MDM: CPT

## 2024-02-29 PROCEDURE — 99203 OFFICE O/P NEW LOW 30 MIN: CPT | Performed by: FAMILY MEDICINE

## 2024-02-29 PROCEDURE — 73080 X-RAY EXAM OF ELBOW: CPT | Mod: RIGHT SIDE | Performed by: RADIOLOGY

## 2024-02-29 RX ORDER — IBUPROFEN 600 MG/1
600 TABLET ORAL EVERY 6 HOURS PRN
Qty: 28 TABLET | Refills: 0 | Status: SHIPPED | OUTPATIENT
Start: 2024-02-29 | End: 2024-03-07

## 2024-02-29 RX ORDER — METHYLPREDNISOLONE 4 MG/1
TABLET ORAL
Qty: 1 EACH | Refills: 0 | Status: SHIPPED | OUTPATIENT
Start: 2024-02-29 | End: 2024-06-11 | Stop reason: ALTCHOICE

## 2024-02-29 ASSESSMENT — PAIN DESCRIPTION - LOCATION: LOCATION: ARM

## 2024-02-29 ASSESSMENT — PAIN SCALES - GENERAL
PAINLEVEL_OUTOF10: 2
PAINLEVEL_OUTOF10: 2

## 2024-02-29 ASSESSMENT — COLUMBIA-SUICIDE SEVERITY RATING SCALE - C-SSRS
1. IN THE PAST MONTH, HAVE YOU WISHED YOU WERE DEAD OR WISHED YOU COULD GO TO SLEEP AND NOT WAKE UP?: NO
6. HAVE YOU EVER DONE ANYTHING, STARTED TO DO ANYTHING, OR PREPARED TO DO ANYTHING TO END YOUR LIFE?: NO
2. HAVE YOU ACTUALLY HAD ANY THOUGHTS OF KILLING YOURSELF?: NO

## 2024-02-29 ASSESSMENT — LIFESTYLE VARIABLES
HAVE YOU EVER FELT YOU SHOULD CUT DOWN ON YOUR DRINKING: NO
EVER HAD A DRINK FIRST THING IN THE MORNING TO STEADY YOUR NERVES TO GET RID OF A HANGOVER: NO
EVER FELT BAD OR GUILTY ABOUT YOUR DRINKING: NO
HAVE PEOPLE ANNOYED YOU BY CRITICIZING YOUR DRINKING: NO

## 2024-02-29 ASSESSMENT — PAIN DESCRIPTION - ORIENTATION: ORIENTATION: RIGHT

## 2024-02-29 ASSESSMENT — PAIN - FUNCTIONAL ASSESSMENT
PAIN_FUNCTIONAL_ASSESSMENT: 0-10
PAIN_FUNCTIONAL_ASSESSMENT: 0-10

## 2024-02-29 NOTE — LETTER
February 29, 2024     Patient: Larry Murphy   YOB: 1970   Date of Visit: 2/29/2024       To Whom It May Concern:    Larry Murphy was seen in my clinic on 2/29/2024 at 1:00 pm. Please excuse Larry for his absence from work on this day to make the appointment.  May return to work with the following restrictions, light duty work no pushing pulling lifting more than 5lbs until follow up visit in three weeks.    If you have any questions or concerns, please don't hesitate to call.         Sincerely,         ARIS [id number] ROOM 1        CC: No Recipients

## 2024-02-29 NOTE — ED PROVIDER NOTES
"HPI   Chief Complaint   Patient presents with    Arm Injury     Pt arrives from work with c/o injury to right elbow. States he was using a wrench when is slipped and hit his right elbow. Pt states \"there's a big knot on it\" injured site is currently wrapped in a dressing       53-year-old male patient comes in the emergency department today with complaints of right elbow injury while at work.  He describes he was using a wrench when the wrench slipped and his arm through back and he hit his elbow on a piece of steel.  Denies any bleeding but states it immediately began to swell up.  He describes that he has worsening discomfort when he flexes the elbow.  Rates pain currently a 2 out of 10 on the pain scale.  Otherwise has no other complaints present time.  For this purpose he was brought to the emergency department today further evaluation.                          Porter Coma Scale Score: 15                     Patient History   Past Medical History:   Diagnosis Date    Other specified health status     No pertinent past medical history     History reviewed. No pertinent surgical history.  Family History   Problem Relation Name Age of Onset    No Known Problems Mother      No Known Problems Father      No Known Problems Other       Social History     Tobacco Use    Smoking status: Never     Passive exposure: Never    Smokeless tobacco: Current     Types: Snuff   Substance Use Topics    Alcohol use: Never    Drug use: Never       Physical Exam   ED Triage Vitals [02/29/24 0642]   Temperature Heart Rate Respirations BP   36.4 °C (97.5 °F) 78 18 (!) 148/93      Pulse Ox Temp Source Heart Rate Source Patient Position   97 % Temporal -- --      BP Location FiO2 (%)     -- --       Physical Exam  Constitutional:       Appearance: Normal appearance.   HENT:      Head: Normocephalic and atraumatic.      Nose: Nose normal.   Eyes:      Extraocular Movements: Extraocular movements intact.      Conjunctiva/sclera: " Conjunctivae normal.      Pupils: Pupils are equal, round, and reactive to light.   Cardiovascular:      Rate and Rhythm: Normal rate and regular rhythm.   Pulmonary:      Effort: Pulmonary effort is normal. No respiratory distress.      Breath sounds: Normal breath sounds. No stridor. No wheezing.   Musculoskeletal:         General: Tenderness (Tenderness palpation over the posterior elbow.,  Acute olecranon bursitis) present. Normal range of motion.      Cervical back: Normal range of motion.   Skin:     General: Skin is warm and dry.   Neurological:      General: No focal deficit present.      Mental Status: He is alert and oriented to person, place, and time. Mental status is at baseline.   Psychiatric:         Mood and Affect: Mood normal.         ED Course & MDM   Diagnoses as of 02/29/24 0839   Olecranon bursitis of right elbow       Medical Decision Making  53-year-old male patient comes in the emergency department today with complaints of right elbow injury while at work.  He describes he was using a wrench when the wrench slipped and his arm through back and he hit his elbow on a piece of steel.  Denies any bleeding but states it immediately began to swell up.  He describes that he has worsening discomfort when he flexes the elbow.  Rates pain currently a 2 out of 10 on the pain scale.  Otherwise has no other complaints present time.  For this purpose he was brought to the emergency department today further evaluation.    X-ray of the right elbow ordered to rule out any acute fracture or dislocation.    Patient has negative x-ray for acute fracture or dislocation.  Consistent with the clinical exam of traumatic olecranon bursitis.  Will discharge patient on p.o. NSAIDs and have nursing staff wrapped the elbow.  Will have [facility] with [facility]  Patient agrees with this plan expressed full verbal understanding.  Questions answered.    Historians the patient    Diagnosis: Traumatic  olecranon bursitis      Labs Reviewed - No data to display     XR elbow right 3+ views   Final Result   Olecranon bursitis versus trauma with no acute bony abnormality             MACRO:   None        Signed by: Olivia Yaets 2/29/2024 8:27 AM   Dictation workstation:   [id number]          Procedure  Procedures     Zeyad Flores PA-C  02/29/24 0839

## 2024-02-29 NOTE — PROGRESS NOTES
Acute Injury New Patient Visit    CC:   Chief Complaint   Patient presents with    Right Elbow - Pain     Rt elbow olecranon bursitis  Xrays at [facility]  Bumped arm on wall       HPI: Larry is a 53 y.o.male who presents today with new complaints of pain and discomfort to the right elbow.  He had smacked it earlier today while at work.  He works the night shift.  He bumped it on a steel wall he noted some immediate swelling and fullness.  He had limited ability to flex and extend the elbow.  He subsequently went to the emergency department where x-rays were obtained and ruled out any fracture.  He presents here today feeling better the swelling is gone down he denies any additional issues or concerns denies any numbness tingling or burning, no history of prior injury or trauma in the past        Review of Systems   GENERAL: Negative for malaise, significant weight loss, fever  MUSCULOSKELETAL: See HPI  NEURO: Negative for numbness / tingling     Past Medical History  Past Medical History:   Diagnosis Date    Other specified health status     No pertinent past medical history       Medication review  Medication Documentation Review Audit       Reviewed by Tricia Villa MA (Technologist) on 02/29/24 at 1324      Medication Order Taking? Sig Documenting Provider Last Dose Status   atorvastatin (Lipitor) 40 mg tablet [id number]  TAKE 1 TABLET BY MOUTH EVERYDAY AT BEDTIME Kvng Lainez MD  Active   ibuprofen 600 mg tablet [id number]  Take 1 tablet (600 mg) by mouth every 6 hours if needed for mild pain (1 - 3) or fever (temp greater than 38.0 C) for up to 7 days. Zeyad Flores PA-C  Active   OXcarbazepine (Trileptal) 300 mg tablet [id number]  Take 1 tablet (300 mg) by mouth twice a day. Historical Provider, MD  Active   testosterone 20.25 mg/1.25 gram (1.62 %) gel in metered-dose pump [id number]  APPLY 2 PUMPS ONTO THE SKIN DAILY AS DIRECTED Historical Provider, MD  Active   UNABLE TO FIND [id number]  1 each by Does  not apply route. Historical Provider, MD  Active   UNABLE TO FIND [id number]  by Does not apply route. Historical Provider, MD  Active                    Allergies  Allergies   Allergen Reactions    Penicillins Anaphylaxis and Swelling    Sulfamethoxazole-Trimethoprim Unknown, Itching and Rash       Social History  Social History     Socioeconomic History    Marital status:      Spouse name: Not on file    Number of children: Not on file    Years of education: Not on file    Highest education level: Not on file   Occupational History    Not on file   Tobacco Use    Smoking status: Never     Passive exposure: Never    Smokeless tobacco: Current     Types: Snuff   Substance and Sexual Activity    Alcohol use: Never    Drug use: Never    Sexual activity: Not on file   Other Topics Concern    Not on file   Social History Narrative    Not on file     Social Determinants of Health     Financial Resource Strain: Not on file   Food Insecurity: Not on file   Transportation Needs: Not on file   Physical Activity: Not on file   Stress: Not on file   Social Connections: Not on file   Intimate Partner Violence: Not on file   Housing Stability: Not on file       Surgical History  History reviewed. No pertinent surgical history.    Physical Exam:  GENERAL:  Patient is awake, alert, and oriented to person place and time.  Patient appears well nourished and well kept.  Affect Calm, Not Acutely Distressed.  HEENT:  Normocephalic, Atraumatic, EOMI  CARDIOVASCULAR:  Hemodynamically stable.  RESPIRATORY:  Normal respirations with unlabored breathing.  NEURO: Gross sensation intact to the upper extremities bilaterally.  Extremity: Right elbow demonstrates obvious soft tissue fullness and fluctuance with symptomatic olecranon bursa.  He has no bony tenderness to the olecranon medial or lateral epicondyle.  He has full intact extensor about the elbow and full flexion.   strength equal symmetric and intact no redness warmth or  erythema over the olecranon.      Diagnostics: Previous x-rays reviewed  XR elbow right 3+ views          Interpreted By:  Olivia Yates,   STUDY:  XR ELBOW RIGHT 3+ VIEWS; ;  2/29/2024 8:12 am      INDICATION:  Signs/Symptoms:Hit elbow, swelling.      COMPARISON:  None.      ACCESSION NUMBER(S):  [id number]      ORDERING CLINICIAN:  TMOASA TORRES      FINDINGS:  Five views right elbow:  There is no fracture, dislocation or joint effusion.  There is swelling around the olecranon, likely bursitis or trauma.      IMPRESSION:  Olecranon bursitis versus trauma with no acute bony abnormality          MACRO:  None      Signed by: Olivia Yates 2/29/2024 8:27 AM  Dictation workstation:   [id number]             Procedure: None  Procedures    Assessment:   Problem List Items Addressed This Visit    None  Visit Diagnoses       Olecranon bursitis of right elbow    -  Primary    Relevant Medications    methylPREDNISolone (Medrol Dospak) 4 mg tablets    Other Relevant Orders    General supply request elbow compression sleeve             Plan: At this time we will offer the patient a short course of an oral steroid in addition to the recommendation for lots of icing and compression wrap or elbow compression sleeve.  Will see him back in 2 weeks for repeat evaluation no need for x-rays at that time.  Will rule out any worsening bursitis or septic bursitis at that time.  Should he develop worsening pain redness fevers or chills he should call and return for repeat evaluation.  For now we will hold off on antibiotics.  He will be provided a light duty return to work note to allow for no lifting pushing or pulling more than 15 pounds to the right upper extremity.  Orders Placed This Encounter    General supply request elbow compression sleeve    methylPREDNISolone (Medrol Dospak) 4 mg tablets      At the conclusion of the visit there were no further questions by the patient/family regarding their plan of care.  Patient was  instructed to call or return with any issues, questions, or concerns regarding their injury and/or treatment plan described above.     02/29/24 at 2:33 PM - Cole C Budinsky, MD    Office: [phone]    This note was prepared using voice recognition software.  The details of this note are correct and have been reviewed, and corrected to the best of my ability.  Some grammatical errors may persist related to the Dragon software.

## 2024-02-29 NOTE — Clinical Note
Larry Murphy was seen and treated in our emergency department on 2/29/2024.  He may return to work on 03/01/2024.       If you have any questions or concerns, please don't hesitate to call.      Zeyad Flores PA-C

## 2024-02-29 NOTE — LETTER
February 29, 2024     Patient: Larry Murphy   YOB: 1970   Date of Visit: 2/29/2024       To Whom It May Concern:    Larry Murphy was seen in my clinic on 2/29/2024 at 1:00 pm. Please excuse Larry for his absence from work on this day to make the appointment.    May return to work with the following restrictions, light duty work only with no pushing pulling lifting more than 5lbs until follow up visit.    If you have any questions or concerns, please don't hesitate to call.         Sincerely,         ARIS [id number] ROOM 1        CC: No Recipients

## 2024-03-01 ENCOUNTER — LAB (OUTPATIENT)
Dept: LAB | Facility: LAB | Age: 54
End: 2024-03-01
Payer: COMMERCIAL

## 2024-03-01 DIAGNOSIS — N20.0 CALCULUS OF KIDNEY: Primary | ICD-10-CM

## 2024-03-01 LAB
BUN SERPL-MCNC: 18 MG/DL (ref 6–23)
CALCIUM SERPL-MCNC: 9.1 MG/DL (ref 8.6–10.3)
CHLORIDE SERPL-SCNC: 107 MMOL/L (ref 98–107)
CO2 SERPL-SCNC: 27 MMOL/L (ref 21–32)
CREAT SERPL-MCNC: 1.26 MG/DL (ref 0.5–1.3)
EGFRCR SERPLBLD CKD-EPI 2021: 67 ML/MIN/1.73M*2
PHOSPHATE SERPL-MCNC: 2.5 MG/DL (ref 2.5–4.9)
PTH-INTACT SERPL-MCNC: 172 PG/ML (ref 18.5–88)
SODIUM SERPL-SCNC: 142 MMOL/L (ref 136–145)
URATE SERPL-MCNC: 6 MG/DL (ref 4–7.5)

## 2024-03-01 PROCEDURE — 84295 ASSAY OF SERUM SODIUM: CPT

## 2024-03-01 PROCEDURE — 82565 ASSAY OF CREATININE: CPT

## 2024-03-01 PROCEDURE — 83970 ASSAY OF PARATHORMONE: CPT

## 2024-03-01 PROCEDURE — 82435 ASSAY OF BLOOD CHLORIDE: CPT

## 2024-03-01 PROCEDURE — 82310 ASSAY OF CALCIUM: CPT

## 2024-03-01 PROCEDURE — 84520 ASSAY OF UREA NITROGEN: CPT

## 2024-03-01 PROCEDURE — 84100 ASSAY OF PHOSPHORUS: CPT

## 2024-03-01 PROCEDURE — 82374 ASSAY BLOOD CARBON DIOXIDE: CPT

## 2024-03-01 PROCEDURE — 36415 COLL VENOUS BLD VENIPUNCTURE: CPT

## 2024-03-01 PROCEDURE — 84550 ASSAY OF BLOOD/URIC ACID: CPT

## 2024-03-09 ENCOUNTER — LAB (OUTPATIENT)
Dept: LAB | Facility: LAB | Age: 54
End: 2024-03-09
Payer: COMMERCIAL

## 2024-03-09 DIAGNOSIS — N20.0 CALCULUS OF KIDNEY: Primary | ICD-10-CM

## 2024-03-09 LAB
CALCIUM (MG/L) IN 24 HOUR URINE: 438 MG/24H (ref 100–300)
CALCIUM 24H UR-MRATE: 33.7 MG/DL
COLLECT DURATION TIME SPEC: 24 HRS
CREAT 24H UR-MCNC: 129.5 MG/DL (ref 20–370)
CREAT 24H UR-MCNC: 135.4 MG/DL (ref 20–370)
CREAT 24H UR-MCNC: 135.4 MG/DL (ref 20–370)
CREAT 24H UR-MRATE: 1.68 G/24 H (ref 0.87–2.41)
CREAT 24H UR-MRATE: 1.76 G/24 H (ref 0.87–2.41)
CREAT 24H UR-MRATE: 1.76 G/24 H (ref 0.87–2.41)
MAGNESIUM 24H UR-MCNC: 15.08 MG/DL
MAGNESIUM 24H UR-MRATE: 196 MG/24 H (ref 10–240)
PHOSPHATE 24H UR-MRATE: 116 MG/DL
PHOSPHORUS 24 HOUR URINE: 1508 MG/24H (ref 400–1300)
SODIUM 24H UR-SCNC: 115 MMOL/L
SODIUM 24H UR-SRATE: 150 MMOL/24 H (ref 80–220)
SPECIMEN VOL 24H UR: 1300 ML
URATE 24H UR-MCNC: 64 MG/DL
URIC ACID 24 HOUR URINE: 832 MG/24H (ref 150–990)

## 2024-03-09 PROCEDURE — 82507 ASSAY OF CITRATE: CPT

## 2024-03-09 PROCEDURE — 83945 ASSAY OF OXALATE: CPT

## 2024-03-09 PROCEDURE — 84105 ASSAY OF URINE PHOSPHORUS: CPT

## 2024-03-09 PROCEDURE — 81050 URINALYSIS VOLUME MEASURE: CPT

## 2024-03-09 PROCEDURE — 83735 ASSAY OF MAGNESIUM: CPT

## 2024-03-09 PROCEDURE — 82340 ASSAY OF CALCIUM IN URINE: CPT

## 2024-03-09 PROCEDURE — 82570 ASSAY OF URINE CREATININE: CPT

## 2024-03-09 PROCEDURE — 84300 ASSAY OF URINE SODIUM: CPT

## 2024-03-09 PROCEDURE — 84560 ASSAY OF URINE/URIC ACID: CPT

## 2024-03-13 ENCOUNTER — OFFICE VISIT (OUTPATIENT)
Dept: ORTHOPEDIC SURGERY | Facility: CLINIC | Age: 54
End: 2024-03-13
Payer: COMMERCIAL

## 2024-03-13 DIAGNOSIS — M70.21 OLECRANON BURSITIS OF RIGHT ELBOW: Primary | ICD-10-CM

## 2024-03-13 LAB
COLLECT DURATION TIME SPEC: 24 HR
CREAT 24H UR-MRATE: 1937 MG/D (ref 800–2100)
CREAT UR-MCNC: 149 MG/DL
OXALATE 24H UR-MRATE: 25 MG/D (ref 16–49)
OXALATE UR-MCNC: 19 MG/L
SPECIMEN VOL ?TM UR: 1300 ML

## 2024-03-13 PROCEDURE — 99213 OFFICE O/P EST LOW 20 MIN: CPT | Performed by: FAMILY MEDICINE

## 2024-03-13 NOTE — PROGRESS NOTES
Established Patient Follow-Up Visit    CC:   Chief Complaint   Patient presents with    Right Elbow - Follow-up, Worker's Compensation     Olecranon Bursitis after steroids       HPI:  Larry is a 53 y.o. male returns here today for follow-up visit regarding: Traumatic right olecranon bursitis.  He states that the steroids helped he has no more swelling or pain or discomfort.  He continues to utilize his compression elbow bandage.          REVIEW OF SYSTEMS:  GENERAL: Negative for malaise, significant weight loss, fever  MUSCULOSKELETAL: See HPI  NEURO: Negative for numbness / tingling       PHYSICAL EXAM:  -Neuro: Gross sensation intact to the upper extremities bilaterally.  -Extremity: Right elbow exam demonstrates skin which is warm pink well-perfused no presence of mass or effusion.  There is no pain or discomfort at the olecranon no medial lateral epicondylitis 4 compartment soft compressible  strength equal symmetric and intact    IMAGING: No new imaging today      PROCEDURE: None  Procedures     ASSESSMENT:   Follow-up visit for:  Problem List Items Addressed This Visit    None  Visit Diagnoses       Olecranon bursitis of right elbow    -  Primary             PLAN: At this time we will have the patient continue with compression Ace wrap bandage to the elbow for the next several weeks should there be any acute worsening or increase in the swelling will have him return and we will submit for authorization for an injection.  He should call or return with any issues on the left is given a work note for unrestricted return to work activities.  No orders of the defined types were placed in this encounter.          At the conclusion of the visit there were no further questions by the patient/family regarding their plan of care.  Patient was instructed to call or return with any issues, questions, or concerns regarding their injury and/or treatment plan described above.     03/13/24 at 4:06 PM - Cole C Budinsky,  MD    Office: [phone]    This note was prepared using voice recognition software.  The details of this note are correct and have been reviewed, and corrected to the best of my ability.  Some grammatical errors may persist related to the Dragon software.

## 2024-03-13 NOTE — LETTER
March 13, 2024     Patient: Larry Murphy   YOB: 1970   Date of Visit: 3/13/2024       To Whom It May Concern:    It is my medical opinion that Larry Murphy  May return to work on 03/14/2024 with no restrictions . Please excuse him for any time missed for today's appointment.    If you have any questions or concerns, please don't hesitate to call.         Sincerely,        Cole C Budinsky, MD    CC: No Recipients

## 2024-03-14 LAB
CITRATE 24H UR-MRATE: 365 MG/D (ref 320–1240)
CITRATE UR-MCNC: 281 MG/L
CITRATE/CREAT UR: 189 MG/G
COLLECT DURATION TIME SPEC: 24 HR
CREAT 24H UR-MRATE: 1937 MG/D (ref 800–2100)
CREAT UR-MCNC: 149 MG/DL
SPECIMEN VOL ?TM UR: 1300 ML

## 2024-06-11 ENCOUNTER — APPOINTMENT (OUTPATIENT)
Dept: PRIMARY CARE | Facility: CLINIC | Age: 54
End: 2024-06-11
Payer: COMMERCIAL

## 2024-06-11 ENCOUNTER — LAB (OUTPATIENT)
Dept: LAB | Facility: LAB | Age: 54
End: 2024-06-11
Payer: COMMERCIAL

## 2024-06-11 VITALS
HEART RATE: 82 BPM | TEMPERATURE: 97.5 F | SYSTOLIC BLOOD PRESSURE: 132 MMHG | BODY MASS INDEX: 30.98 KG/M2 | DIASTOLIC BLOOD PRESSURE: 82 MMHG | HEIGHT: 69 IN | RESPIRATION RATE: 16 BRPM | OXYGEN SATURATION: 96 % | WEIGHT: 209.2 LBS
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
## 2024-06-11 DIAGNOSIS — G47.33 OBSTRUCTIVE SLEEP APNEA SYNDROME: Primary | ICD-10-CM

## 2024-06-11 DIAGNOSIS — Z11.59 NEED FOR HEPATITIS C SCREENING TEST: ICD-10-CM

## 2024-06-11 DIAGNOSIS — Z11.4 ENCOUNTER FOR SCREENING FOR HIV: ICD-10-CM

## 2024-06-11 DIAGNOSIS — S80.861A TICK BITE OF RIGHT LOWER LEG, INITIAL ENCOUNTER: ICD-10-CM

## 2024-06-11 DIAGNOSIS — R03.0 PREHYPERTENSION: ICD-10-CM

## 2024-06-11 DIAGNOSIS — E29.1 HYPOGONADISM MALE: ICD-10-CM

## 2024-06-11 DIAGNOSIS — E78.2 MIXED HYPERLIPIDEMIA: ICD-10-CM

## 2024-06-11 DIAGNOSIS — Z12.5 SCREENING PSA (PROSTATE SPECIFIC ANTIGEN): ICD-10-CM

## 2024-06-11 LAB
HCV AB SER QL: NONREACTIVE
HIV 1+2 AB+HIV1 P24 AG SERPL QL IA: NONREACTIVE
PSA SERPL-MCNC: 0.7 NG/ML

## 2024-06-11 PROCEDURE — 36415 COLL VENOUS BLD VENIPUNCTURE: CPT

## 2024-06-11 PROCEDURE — 87476 LYME DIS DNA AMP PROBE: CPT

## 2024-06-11 PROCEDURE — 3008F BODY MASS INDEX DOCD: CPT | Performed by: FAMILY MEDICINE

## 2024-06-11 PROCEDURE — 4004F PT TOBACCO SCREEN RCVD TLK: CPT | Performed by: FAMILY MEDICINE

## 2024-06-11 PROCEDURE — 99214 OFFICE O/P EST MOD 30 MIN: CPT | Performed by: FAMILY MEDICINE

## 2024-06-11 PROCEDURE — 87389 HIV-1 AG W/HIV-1&-2 AB AG IA: CPT

## 2024-06-11 PROCEDURE — 84153 ASSAY OF PSA TOTAL: CPT

## 2024-06-11 PROCEDURE — 84402 ASSAY OF FREE TESTOSTERONE: CPT

## 2024-06-11 PROCEDURE — 86803 HEPATITIS C AB TEST: CPT

## 2024-06-11 RX ORDER — ASPIRIN 81 MG/1
81 TABLET ORAL DAILY
COMMUNITY

## 2024-06-11 NOTE — PROGRESS NOTES
Subjective   Patient ID: Larry Murphy is a 53 y.o. male who presents for Follow-up (/Prehypertension//Mixed hyperlipidemia/). I last saw the patient on 12/11/2023.     HPI     Patient is here for a F/U. Patient is eating well and exercising as tolerated. The patient is compliant with his prescribed medications. He is tolerating his medication well. He mentions his wife was recently diagnosed with lyme disease and he has a tick bite but he is not sure whether the tick is in place for more than 36 hours or not. The tick bite happened about a week ago. He denies any rash or arthritis.      Review of Systems  Except positives as noted in the CC & HPI      Constitutional: Denies fevers, chills, night sweats, fatigue, weight changes, change in appetite    Eyes: Denies blurry vision, double vision    ENT: Denies otalgia, trouble hearing, tinnitus, vertigo, nasal congestion, rhinorrhea, sore throat    Neck: Denies swelling, masses    Cardiovascular: Denies chest pain, palpitations, edema, orthopnea, syncope    Respiratory: Denies dyspnea, cough, wheezing, postural nocturnal dyspnea    Gastrointestinal: Denies abdominal pain, nausea, vomiting, diarrhea, constipation, melena, hematochezia    Genitourinary: Denies dysuria, hematuria, frequency, urgency    Musculoskeletal: Denies back pain, neck pain, arthralgias, myalgias    Integumentary: Denies skin lesions, rashes, masses    Neurological: Denies dizziness, headaches, confusion, limb weakness, paresthesias, syncope, convulsions    Psychiatric: Denies depression, anxiety, homicidal ideations, suicidal ideations, sleep disturbances    Endocrine: Denies polyphagia, polydipsia, polyuria, weakness, hair thinning, heat intolerance, cold intolerance, weight changes    Heme/Lymph: Denies easy bruising, easy bleeding, swollen glands    Objective   /82 (BP Location: Right arm, Patient Position: Sitting, BP Cuff Size: Adult)   Pulse 82   Temp 36.4 °C (97.5 °F) (Temporal)    "Resp 16   Ht 1.753 m (5' 9\")   Wt 94.9 kg (209 lb 3.2 oz)   SpO2 96%   BMI 30.89 kg/m²     142/80 on recheck of BP in the right arm.   Physical Exam    General Appearance - well-developed, well-nourished, 53 y.o., White male in no acute distress.     Skin - warm, pink and dry without rash or concerning lesions.     Mental Status - alert and oriented x 3. Normal mood and affect appropriate to mood.     Neck - supple without lymphadenopathy. Carotid pulses are normal without bruits. Thyroid is normal in midline without nodules.     Chest - lungs are clear to auscultation without rales, rhonchi or wheezes.     Heart - regular, rate and rhythm without murmurs, rubs or gallops.    Extremities - no cyanosis, clubbing or edema. Pedal pulses are 2+ normal at the dorsalis pedis and posterior pulses bilaterally. Posterior right knee - evidence of tick bite with induration of the skin involving the medial aspect.    Neurological - cranial nerves II through XII are grossly intact. Motor strength 5/5 at all fours.     Results  Reviewed echocardiogram results from 10/16/23.      Assessment/Plan   1. Obstructive sleep apnea syndrome        2. Prehypertension  Follow Up In [facility] - PCP - Established    CBC and Auto Differential    Comprehensive Metabolic Panel      3. Mixed hyperlipidemia  Follow Up In [facility] - PCP - Established    Lipid Panel      4. Need for hepatitis C screening test  Hepatitis C Antibody      5. Encounter for screening for HIV  HIV 1/2 Antigen/Antibody Screen with Reflex to Confirmation      6. Hypogonadism male  Testosterone, total and free      7. Tick bite of right lower leg, initial encounter  Lyme Disease (Borrelia burgdorferi), PCR      Patient to continue current medications (with any exceptions as noted) and diet. Follow-up in 6 month(s), otherwise as needed.      Patient is to complete  Lyme disease serology, HIV, Hepatitis C, Total and free testosterone levels today. " Will call patient with results when available.     Patient is to complete fasting CBC, CMP, Lipid profile before his next appointment. Will call patient with results when available.     Counseled him about Lyme disease and to call our office if he notices any rash or other symptoms at the site of the tick bite.    Patient is not a candidate for Lyme's prophylaxis at this time.    Patient is to follow up with Dr. Friend (neuro) as scheduled.     Scribe Attestation  By signing my name below, IDelisa , Catalina   attest that this documentation has been prepared under the direction and in the presence of Kvng Lainez MD.

## 2024-06-11 NOTE — PROGRESS NOTES
"Subjective   Patient ID: Larry Murphy is a 53 y.o. male who presents for Follow-up (/Prehypertension//Mixed hyperlipidemia/). I last saw the patient on 3/1/24    HPI     Patient is here for a F/U  Past medical, surgical, and family history reviewed.  Reviewed and documented all medications (prescriptions, OTCs, herbal therapies and supplements)   Pt eating well, exercising as tolerated and taking medications as directed  Has no medical concerns for rooming MA    Review of Systems  Except positives as noted in the CC & HPI      Constitutional: Denies fevers, chills, night sweats, fatigue, weight changes, change in appetite    Eyes: Denies blurry vision, double vision    ENT: Denies otalgia, trouble hearing, tinnitus, vertigo, nasal congestion, rhinorrhea, sore throat    Neck: Denies swelling, masses    Cardiovascular: Denies chest pain, palpitations, edema, orthopnea, syncope    Respiratory: Denies dyspnea, cough, wheezing, postural nocturnal dyspnea    Gastrointestinal: Denies abdominal pain, nausea, vomiting, diarrhea, constipation, melena, hematochezia    Genitourinary: Denies dysuria, hematuria, frequency, urgency    Musculoskeletal: Denies back pain, neck pain, arthralgias, myalgias    Integumentary: Denies skin lesions, rashes, masses    Neurological: Denies dizziness, headaches, confusion, limb weakness, paresthesias, syncope, convulsions    Psychiatric: Denies depression, anxiety, homicidal ideations, suicidal ideations, sleep disturbances    Endocrine: Denies polyphagia, polydipsia, polyuria, weakness, hair thinning, heat intolerance, cold intolerance, weight changes    Heme/Lymph: Denies easy bruising, easy bleeding, swollen glands    Objective   /82 (BP Location: Right arm, Patient Position: Sitting, BP Cuff Size: Adult)   Pulse 82   Temp 36.4 °C (97.5 °F) (Temporal)   Resp 16   Ht 1.753 m (5' 9\")   Wt 94.9 kg (209 lb 3.2 oz)   SpO2 96%   BMI 30.89 kg/m²     Physical Exam  104/76 on recheck of " BP in the right arm.     General Appearance - well-developed, well-nourished, 53 y.o., White male in no acute distress.     Skin - warm, pink and dry without rash or concerning lesions.     Mental Status - alert and oriented x 3. Normal mood and affect appropriate to mood.     Neck - supple without lymphadenopathy. Carotid pulses are normal without bruits. Thyroid is normal in midline without nodules.     Chest - lungs are clear to auscultation without rales, rhonchi or wheezes.     Heart - regular, rate and rhythm without murmurs, rubs or gallops.    Extremities - no cyanosis, clubbing or edema. Pedal pulses are 2+ normal at the dorsalis pedis and posterior pulses bilaterally.     Neurological - cranial nerves II through XII are grossly intact. Motor strength 5/5 at all fours.     Results  Reviewed echocardiogram results from 10/16/23.      Assessment/Plan   1. Obstructive sleep apnea syndrome        2. Prehypertension  Follow Up [facility] - PCP - Established    CBC and Auto Differential    Comprehensive Metabolic Panel      3. Mixed hyperlipidemia  Follow Up In [facility] - PCP - Established    Lipid Panel      4. Need for hepatitis C screening test  Hepatitis C Antibody      5. Encounter for screening for HIV  HIV 1/2 Antigen/Antibody Screen with Reflex to Confirmation      6. Hypogonadism male  Testosterone, total and free      7. Tick bite of right lower leg, initial encounter  Lyme Disease (Borrelia burgdorferi), PCR      Patient to continue current medications (with any exceptions as noted) and diet. Follow-up in 6 month(s) otherwise as needed.      Patient is to complete fasting CBC, CMP, lipid panel, PSA, testosterone labs today. Will call patient with results when available.     Patient is to follow up with Dr. Friend (neuro) as scheduled.       Scribe Attestation  By signing my name below, IJeana Scribe   attest that this documentation has been prepared under the  direction and in the presence of Kvng Lainez MD.

## 2024-06-14 LAB
B BURGDOR DNA SPEC QL NAA+PROBE: NOT DETECTED
SPECIMEN SOURCE: NORMAL

## 2024-06-19 LAB
TESTOSTERONE FREE (CHAN): 41.1 PG/ML (ref 35–155)
TESTOSTERONE,TOTAL,LC-MS/MS: 302 NG/DL (ref 250–1100)

## 2024-08-06 DIAGNOSIS — E21.3 HYPERPARATHYROIDISM, UNSPECIFIED (MULTI): Primary | ICD-10-CM

## 2024-08-07 ENCOUNTER — LAB (OUTPATIENT)
Dept: LAB | Facility: LAB | Age: 54
End: 2024-08-07
Payer: COMMERCIAL

## 2024-08-07 DIAGNOSIS — E21.3 HYPERPARATHYROIDISM, UNSPECIFIED (MULTI): ICD-10-CM

## 2024-08-07 LAB
ANION GAP SERPL CALC-SCNC: 12 MMOL/L (ref 10–20)
CHLORIDE SERPL-SCNC: 106 MMOL/L (ref 98–107)
CO2 SERPL-SCNC: 27 MMOL/L (ref 21–32)
POTASSIUM SERPL-SCNC: 3.9 MMOL/L (ref 3.5–5.3)
SODIUM SERPL-SCNC: 141 MMOL/L (ref 136–145)

## 2024-08-07 PROCEDURE — 80051 ELECTROLYTE PANEL: CPT

## 2024-12-06 ENCOUNTER — LAB (OUTPATIENT)
Dept: LAB | Facility: LAB | Age: 54
End: 2024-12-06
Payer: COMMERCIAL

## 2024-12-06 DIAGNOSIS — N20.0 CALCULUS OF KIDNEY: Primary | ICD-10-CM

## 2024-12-06 LAB
BUN SERPL-MCNC: 19 MG/DL (ref 6–23)
CALCIUM SERPL-MCNC: 9 MG/DL (ref 8.6–10.3)
CHLORIDE SERPL-SCNC: 104 MMOL/L (ref 98–107)
CO2 SERPL-SCNC: 26 MMOL/L (ref 21–32)
CREAT SERPL-MCNC: 1.16 MG/DL (ref 0.5–1.3)
EGFRCR SERPLBLD CKD-EPI 2021: 74 ML/MIN/1.73M*2
PHOSPHATE SERPL-MCNC: 3 MG/DL (ref 2.5–4.9)
SODIUM SERPL-SCNC: 139 MMOL/L (ref 136–145)
URATE SERPL-MCNC: 6.4 MG/DL (ref 4–7.5)

## 2024-12-06 PROCEDURE — 36415 COLL VENOUS BLD VENIPUNCTURE: CPT

## 2024-12-06 PROCEDURE — 82435 ASSAY OF BLOOD CHLORIDE: CPT

## 2024-12-06 PROCEDURE — 82374 ASSAY BLOOD CARBON DIOXIDE: CPT

## 2024-12-06 PROCEDURE — 84550 ASSAY OF BLOOD/URIC ACID: CPT

## 2024-12-06 PROCEDURE — 84295 ASSAY OF SERUM SODIUM: CPT

## 2024-12-06 PROCEDURE — 83970 ASSAY OF PARATHORMONE: CPT

## 2024-12-06 PROCEDURE — 84100 ASSAY OF PHOSPHORUS: CPT

## 2024-12-06 PROCEDURE — 84520 ASSAY OF UREA NITROGEN: CPT

## 2024-12-06 PROCEDURE — 82565 ASSAY OF CREATININE: CPT

## 2024-12-06 PROCEDURE — 82310 ASSAY OF CALCIUM: CPT

## 2024-12-07 LAB — PTH-INTACT SERPL-MCNC: 140.5 PG/ML (ref 18.5–88)

## 2024-12-09 ENCOUNTER — LAB (OUTPATIENT)
Dept: LAB | Facility: LAB | Age: 54
End: 2024-12-09
Payer: COMMERCIAL

## 2024-12-09 ENCOUNTER — APPOINTMENT (OUTPATIENT)
Dept: PRIMARY CARE | Facility: CLINIC | Age: 54
End: 2024-12-09
Payer: COMMERCIAL

## 2024-12-09 VITALS
HEART RATE: 86 BPM | WEIGHT: 208 LBS | TEMPERATURE: 97.2 F | BODY MASS INDEX: 30.81 KG/M2 | RESPIRATION RATE: 16 BRPM | DIASTOLIC BLOOD PRESSURE: 70 MMHG | HEIGHT: 69 IN | OXYGEN SATURATION: 96 % | SYSTOLIC BLOOD PRESSURE: 120 MMHG

## 2024-12-09 DIAGNOSIS — E78.2 MIXED HYPERLIPIDEMIA: ICD-10-CM

## 2024-12-09 DIAGNOSIS — R03.0 PREHYPERTENSION: ICD-10-CM

## 2024-12-09 DIAGNOSIS — N20.0 CALCULUS OF KIDNEY: ICD-10-CM

## 2024-12-09 DIAGNOSIS — Z23 NEED FOR IMMUNIZATION AGAINST INFLUENZA: ICD-10-CM

## 2024-12-09 DIAGNOSIS — E66.09 CLASS 1 OBESITY DUE TO EXCESS CALORIES WITHOUT SERIOUS COMORBIDITY WITH BODY MASS INDEX (BMI) OF 30.0 TO 30.9 IN ADULT: ICD-10-CM

## 2024-12-09 DIAGNOSIS — R73.9 HYPERGLYCEMIA: ICD-10-CM

## 2024-12-09 DIAGNOSIS — Z00.00 ANNUAL PHYSICAL EXAM: Primary | ICD-10-CM

## 2024-12-09 DIAGNOSIS — E66.811 CLASS 1 OBESITY DUE TO EXCESS CALORIES WITHOUT SERIOUS COMORBIDITY WITH BODY MASS INDEX (BMI) OF 30.0 TO 30.9 IN ADULT: ICD-10-CM

## 2024-12-09 DIAGNOSIS — G47.33 OBSTRUCTIVE SLEEP APNEA SYNDROME: ICD-10-CM

## 2024-12-09 LAB
ALBUMIN SERPL BCP-MCNC: 4.5 G/DL (ref 3.4–5)
ALP SERPL-CCNC: 77 U/L (ref 33–120)
ALT SERPL W P-5'-P-CCNC: 32 U/L (ref 10–52)
ANION GAP SERPL CALC-SCNC: 13 MMOL/L (ref 10–20)
AST SERPL W P-5'-P-CCNC: 19 U/L (ref 9–39)
BASOPHILS # BLD AUTO: 0.04 X10*3/UL (ref 0–0.1)
BASOPHILS NFR BLD AUTO: 0.9 %
BILIRUB SERPL-MCNC: 0.6 MG/DL (ref 0–1.2)
BUN SERPL-MCNC: 13 MG/DL (ref 6–23)
CALCIUM (MG/L) IN 24 HOUR URINE: 194 MG/24H (ref 100–300)
CALCIUM 24H UR-MRATE: 7.2 MG/DL
CALCIUM SERPL-MCNC: 9.6 MG/DL (ref 8.6–10.3)
CHLORIDE SERPL-SCNC: 106 MMOL/L (ref 98–107)
CHOLEST SERPL-MCNC: 109 MG/DL (ref 0–199)
CHOLESTEROL/HDL RATIO: 3
CO2 SERPL-SCNC: 28 MMOL/L (ref 21–32)
COLLECT DURATION TIME SPEC: 24 HRS
CREAT 24H UR-MCNC: 56.5 MG/DL (ref 20–370)
CREAT 24H UR-MCNC: 57.6 MG/DL (ref 20–370)
CREAT 24H UR-MCNC: 57.6 MG/DL (ref 20–370)
CREAT 24H UR-MRATE: 1.53 G/24 H (ref 0.87–2.41)
CREAT 24H UR-MRATE: 1.56 G/24 H (ref 0.87–2.41)
CREAT 24H UR-MRATE: 1.56 G/24 H (ref 0.87–2.41)
CREAT SERPL-MCNC: 1.13 MG/DL (ref 0.5–1.3)
EGFRCR SERPLBLD CKD-EPI 2021: 76 ML/MIN/1.73M*2
EOSINOPHIL # BLD AUTO: 0.1 X10*3/UL (ref 0–0.7)
EOSINOPHIL NFR BLD AUTO: 2.3 %
ERYTHROCYTE [DISTWIDTH] IN BLOOD BY AUTOMATED COUNT: 12.8 % (ref 11.5–14.5)
GLUCOSE SERPL-MCNC: 115 MG/DL (ref 74–99)
HCT VFR BLD AUTO: 44.4 % (ref 41–52)
HDLC SERPL-MCNC: 36.7 MG/DL
HGB BLD-MCNC: 15.3 G/DL (ref 13.5–17.5)
IMM GRANULOCYTES # BLD AUTO: 0.01 X10*3/UL (ref 0–0.7)
IMM GRANULOCYTES NFR BLD AUTO: 0.2 % (ref 0–0.9)
LDLC SERPL CALC-MCNC: 59 MG/DL
LYMPHOCYTES # BLD AUTO: 1.22 X10*3/UL (ref 1.2–4.8)
LYMPHOCYTES NFR BLD AUTO: 28.5 %
MAGNESIUM 24H UR-MCNC: 4.18 MG/DL
MAGNESIUM 24H UR-MRATE: 112.9 MG/24 H (ref 10–240)
MCH RBC QN AUTO: 30.6 PG (ref 26–34)
MCHC RBC AUTO-ENTMCNC: 34.5 G/DL (ref 32–36)
MCV RBC AUTO: 89 FL (ref 80–100)
MONOCYTES # BLD AUTO: 0.28 X10*3/UL (ref 0.1–1)
MONOCYTES NFR BLD AUTO: 6.5 %
NEUTROPHILS # BLD AUTO: 2.63 X10*3/UL (ref 1.2–7.7)
NEUTROPHILS NFR BLD AUTO: 61.6 %
NON HDL CHOLESTEROL: 72 MG/DL (ref 0–149)
NRBC BLD-RTO: 0 /100 WBCS (ref 0–0)
PHOSPHATE 24H UR-MRATE: 20 MG/DL
PHOSPHORUS 24 HOUR URINE: 540 MG/24H (ref 400–1300)
PLATELET # BLD AUTO: 181 X10*3/UL (ref 150–450)
POTASSIUM SERPL-SCNC: 4.7 MMOL/L (ref 3.5–5.3)
PROT SERPL-MCNC: 7 G/DL (ref 6.4–8.2)
RBC # BLD AUTO: 5 X10*6/UL (ref 4.5–5.9)
SODIUM 24H UR-SCNC: 71 MMOL/L
SODIUM 24H UR-SRATE: 192 MMOL/24 H (ref 80–220)
SODIUM SERPL-SCNC: 142 MMOL/L (ref 136–145)
SPECIMEN VOL 24H UR: 2700 ML
TRIGL SERPL-MCNC: 65 MG/DL (ref 0–149)
URATE 24H UR-MCNC: 27 MG/DL
URIC ACID 24 HOUR URINE: 729 MG/24H (ref 150–990)
VLDL: 13 MG/DL (ref 0–40)
WBC # BLD AUTO: 4.3 X10*3/UL (ref 4.4–11.3)

## 2024-12-09 PROCEDURE — 82570 ASSAY OF URINE CREATININE: CPT

## 2024-12-09 PROCEDURE — 81050 URINALYSIS VOLUME MEASURE: CPT

## 2024-12-09 PROCEDURE — 80061 LIPID PANEL: CPT

## 2024-12-09 PROCEDURE — 82507 ASSAY OF CITRATE: CPT

## 2024-12-09 PROCEDURE — 4004F PT TOBACCO SCREEN RCVD TLK: CPT | Performed by: FAMILY MEDICINE

## 2024-12-09 PROCEDURE — 99396 PREV VISIT EST AGE 40-64: CPT | Performed by: FAMILY MEDICINE

## 2024-12-09 PROCEDURE — 84300 ASSAY OF URINE SODIUM: CPT

## 2024-12-09 PROCEDURE — 83036 HEMOGLOBIN GLYCOSYLATED A1C: CPT

## 2024-12-09 PROCEDURE — 85025 COMPLETE CBC W/AUTO DIFF WBC: CPT

## 2024-12-09 PROCEDURE — 84105 ASSAY OF URINE PHOSPHORUS: CPT

## 2024-12-09 PROCEDURE — 36415 COLL VENOUS BLD VENIPUNCTURE: CPT

## 2024-12-09 PROCEDURE — 80053 COMPREHEN METABOLIC PANEL: CPT

## 2024-12-09 PROCEDURE — 83945 ASSAY OF OXALATE: CPT

## 2024-12-09 PROCEDURE — 90471 IMMUNIZATION ADMIN: CPT | Performed by: FAMILY MEDICINE

## 2024-12-09 PROCEDURE — 82340 ASSAY OF CALCIUM IN URINE: CPT

## 2024-12-09 PROCEDURE — 90673 RIV3 VACCINE NO PRESERV IM: CPT | Performed by: FAMILY MEDICINE

## 2024-12-09 PROCEDURE — 3008F BODY MASS INDEX DOCD: CPT | Performed by: FAMILY MEDICINE

## 2024-12-09 PROCEDURE — 84560 ASSAY OF URINE/URIC ACID: CPT

## 2024-12-09 PROCEDURE — 83735 ASSAY OF MAGNESIUM: CPT

## 2024-12-09 ASSESSMENT — PATIENT HEALTH QUESTIONNAIRE - PHQ9
SUM OF ALL RESPONSES TO PHQ9 QUESTIONS 1 AND 2: 0
1. LITTLE INTEREST OR PLEASURE IN DOING THINGS: NOT AT ALL
2. FEELING DOWN, DEPRESSED OR HOPELESS: NOT AT ALL

## 2024-12-09 NOTE — PROGRESS NOTES
Subjective   Patient ID: Larry Murphy is a 54 y.o. male who presents for Follow-up (Obstructive sleep apnea syndrome//). I last saw the patient on 6/11/2023.     HPI     Patient is here for a F/U    Past medical, surgical, and family history reviewed.  Reviewed and documented all medications   Pt eating well, exercising as tolerated and taking medications as directed    Patient has no complains today. He did have his labs done earlier today. He would like to get a FLU shot.      Review of Systems  Except positives as noted in the CC & HPI      Constitutional: Denies fevers, chills, night sweats, fatigue, weight changes, change in appetite    Eyes: Denies blurry vision, double vision    ENT: Denies otalgia, trouble hearing, tinnitus, vertigo, nasal congestion, rhinorrhea, sore throat    Neck: Denies swelling, masses    Cardiovascular: Denies chest pain, palpitations, edema, orthopnea, syncope    Respiratory: Denies dyspnea, cough, wheezing, postural nocturnal dyspnea    Gastrointestinal: Denies abdominal pain, nausea, vomiting, diarrhea, constipation, melena, hematochezia    Genitourinary: Denies dysuria, hematuria, frequency, urgency    Musculoskeletal: Denies back pain, neck pain, arthralgias, myalgias    Integumentary: Denies skin lesions, rashes, masses    Neurological: Denies dizziness, headaches, confusion, limb weakness, paresthesias, syncope, convulsions    Psychiatric: Denies depression, anxiety, homicidal ideations, suicidal ideations, sleep disturbances    Endocrine: Denies polyphagia, polydipsia, polyuria, weakness, hair thinning, heat intolerance, cold intolerance, weight changes    Heme/Lymph: Denies easy bruising, easy bleeding, swollen glands    Objective   Vitals:    12/09/24 0944 12/09/24 1032   BP: 120/80 120/70   BP Location:  Right arm   Patient Position:  Sitting   BP Cuff Size:  Adult long   Pulse: 86    Resp: 16    Temp: 36.2 °C (97.2 °F)    SpO2: 96%    Weight: 94.3 kg (208 lb)    Height:  "1.753 m (5' 9\")         Physical Exam    General Appearance - well-developed, well-nourished, 54 y.o., White male in no acute distress.     Skin - warm, pink and dry without rash or concerning lesions.     Mental Status - alert and oriented x 3. Normal mood and affect appropriate to mood.     Neck - supple without lymphadenopathy. Carotid pulses are normal without bruits. Thyroid is normal in midline without nodules.     Chest - lungs are clear to auscultation without rales, rhonchi or wheezes.     Heart - regular, rate and rhythm without murmurs, rubs or gallops.    Abdomen - soft, obese, protuberant, , nontender, nondistended. No masses, hepatomegaly or splenomegaly is noted. No rebound, rigidity or guarding is noted. Bowel sounds are normoactive.     Extremities - no cyanosis, clubbing or edema. Pedal pulses are 2+ normal at the dorsalis pedis and posterior pulses bilaterally.     Neurological - cranial nerves II through XII are grossly intact. Motor strength 5/5 at all fours.       Assessment/Plan   1. Annual physical exam        2. Prehypertension        3. Mixed hyperlipidemia        4. Obstructive sleep apnea syndrome        5. Class 1 obesity due to excess calories without serious comorbidity with body mass index (BMI) of 30.0 to 30.9 in adult        6. Need for immunization against influenza  Flu vaccine, trivalent, preservative free, no egg protein, age 18y+ (Flublok)        Patient to continue current medications (with any exceptions as noted) and diet. Follow-up in 6 month(s), otherwise as needed.      Flublok vaccine given in the office today.    Patient may obtain the COVID vaccine if he desires.    Patient is to follow up with Dr. Friend (neuro) and urology as scheduled.     Scribe Attestation  By signing my name below, IDelisa Scribe   attest that this documentation has been prepared under the direction and in the presence of Kvng Lainez MD.    "

## 2024-12-10 LAB
EST. AVERAGE GLUCOSE BLD GHB EST-MCNC: 108 MG/DL
HBA1C MFR BLD: 5.4 %

## 2024-12-12 LAB
CITRATE 24H UR-MRATE: 707 MG/D (ref 320–1240)
CITRATE UR-MCNC: 262 MG/L
CITRATE/CREAT UR: 437 MG/G
COLLECT DURATION TIME SPEC: 24 HR
COLLECT DURATION TIME SPEC: 24 HR
CREAT 24H UR-MRATE: 1620 MG/D (ref 800–2100)
CREAT 24H UR-MRATE: 1620 MG/D (ref 800–2100)
CREAT UR-MCNC: 60 MG/DL
CREAT UR-MCNC: 60 MG/DL
OXALATE 24H UR-MRATE: 46 MG/D (ref 16–49)
OXALATE UR-MCNC: 17 MG/L
SPECIMEN VOL ?TM UR: 2700 ML
SPECIMEN VOL ?TM UR: 2700 ML

## 2025-06-09 ENCOUNTER — APPOINTMENT (OUTPATIENT)
Dept: PRIMARY CARE | Facility: CLINIC | Age: 55
End: 2025-06-09
Payer: COMMERCIAL